# Patient Record
Sex: MALE | Race: WHITE
[De-identification: names, ages, dates, MRNs, and addresses within clinical notes are randomized per-mention and may not be internally consistent; named-entity substitution may affect disease eponyms.]

---

## 2018-04-16 ENCOUNTER — HOSPITAL ENCOUNTER (OUTPATIENT)
Dept: HOSPITAL 47 - ORWHC2ENDO | Age: 58
Discharge: HOME | End: 2018-04-16
Payer: COMMERCIAL

## 2018-04-16 VITALS — HEART RATE: 67 BPM | SYSTOLIC BLOOD PRESSURE: 147 MMHG | DIASTOLIC BLOOD PRESSURE: 87 MMHG

## 2018-04-16 VITALS — TEMPERATURE: 98.1 F | RESPIRATION RATE: 16 BRPM

## 2018-04-16 VITALS — BODY MASS INDEX: 30.7 KG/M2

## 2018-04-16 DIAGNOSIS — I10: ICD-10-CM

## 2018-04-16 DIAGNOSIS — F17.210: ICD-10-CM

## 2018-04-16 DIAGNOSIS — Z79.899: ICD-10-CM

## 2018-04-16 DIAGNOSIS — I25.10: ICD-10-CM

## 2018-04-16 DIAGNOSIS — Z86.010: ICD-10-CM

## 2018-04-16 DIAGNOSIS — Z79.82: ICD-10-CM

## 2018-04-16 DIAGNOSIS — Z12.11: Primary | ICD-10-CM

## 2018-04-16 DIAGNOSIS — Z87.19: ICD-10-CM

## 2018-04-16 DIAGNOSIS — Z80.0: ICD-10-CM

## 2018-04-16 DIAGNOSIS — E78.5: ICD-10-CM

## 2018-04-16 DIAGNOSIS — K57.30: ICD-10-CM

## 2018-04-16 PROCEDURE — 45378 DIAGNOSTIC COLONOSCOPY: CPT

## 2018-04-16 NOTE — P.PCN
Date of Procedure: 04/16/18


Procedure(s) Performed: 


Procedure: Total colonoscopy.





Preoperative diagnosis: Screening for neoplasia, patient has history of polyps.





Preoperative diagnosis: Diverticulosis with no evidence of acute diverticulitis

, strictures, polyps or cancer.





Preparation: HalfLytely prep.





Sedation: Was provided by anesthesia.





Brief clinical history: The patient is a 57-year-old male who is scheduled for 

this evaluation for screening for neoplasia because of history of polyps.  His 

last exam was in January 2015 and he had a tubulovillous adenoma removed in the 

sigmoid and to diminutive rectal polyps were removed that showed hyperplastic 

polyps.  The patient has family history of colon cancer as well.  The patient 

has no abdominal complaints, bleeding or anemia.  





Procedure: With the patient on his left lateral decubitus position and after 

informed consent and adequate sedation, the perianal area was inspected and it 

did not show any fissures or fistulas.  There were no masses felt on digital 

rectal examination.  The Olympus CFQ 160L video colonoscope was then inserted 

in the rectum in the usual fashion and advanced to the cecum.  The mucosa 

appeared healthy.  No polyps or tumors were seen.  Several diverticular 

orifices were seen scattered in the sigmoid and on the right side with no 

evidence of acute diverticulitis or strictures.  I retroflexed the endoscope in 

the rectum before the endoscope was withdrawn.





The patient tolerated the procedure well.





Plan: The patient was reassured.  Discussed dietary measures.  He will follow 

up with you as planned and I recommended repeat exam in 5 years.

## 2018-07-26 ENCOUNTER — HOSPITAL ENCOUNTER (INPATIENT)
Dept: HOSPITAL 47 - EC | Age: 58
LOS: 2 days | Discharge: HOME | DRG: 247 | End: 2018-07-28
Attending: HOSPITALIST | Admitting: HOSPITALIST
Payer: COMMERCIAL

## 2018-07-26 VITALS — BODY MASS INDEX: 29.2 KG/M2

## 2018-07-26 DIAGNOSIS — Z79.899: ICD-10-CM

## 2018-07-26 DIAGNOSIS — Z86.010: ICD-10-CM

## 2018-07-26 DIAGNOSIS — Z79.02: ICD-10-CM

## 2018-07-26 DIAGNOSIS — I25.110: Primary | ICD-10-CM

## 2018-07-26 DIAGNOSIS — Z87.442: ICD-10-CM

## 2018-07-26 DIAGNOSIS — J42: ICD-10-CM

## 2018-07-26 DIAGNOSIS — F17.210: ICD-10-CM

## 2018-07-26 DIAGNOSIS — Z82.49: ICD-10-CM

## 2018-07-26 DIAGNOSIS — E78.5: ICD-10-CM

## 2018-07-26 DIAGNOSIS — Z79.82: ICD-10-CM

## 2018-07-26 DIAGNOSIS — I10: ICD-10-CM

## 2018-07-26 LAB
ALBUMIN SERPL-MCNC: 4.1 G/DL (ref 3.5–5)
ALP SERPL-CCNC: 44 U/L (ref 38–126)
ALT SERPL-CCNC: 32 U/L (ref 21–72)
ANION GAP SERPL CALC-SCNC: 10 MMOL/L
APTT BLD: 27.7 SEC (ref 22–30)
AST SERPL-CCNC: 25 U/L (ref 17–59)
BASOPHILS # BLD AUTO: 0 K/UL (ref 0–0.2)
BASOPHILS NFR BLD AUTO: 1 %
BUN SERPL-SCNC: 13 MG/DL (ref 9–20)
CALCIUM SPEC-MCNC: 8.5 MG/DL (ref 8.4–10.2)
CHLORIDE SERPL-SCNC: 108 MMOL/L (ref 98–107)
CK SERPL-CCNC: 116 U/L (ref 55–170)
CO2 SERPL-SCNC: 23 MMOL/L (ref 22–30)
D DIMER PPP FEU-MCNC: <0.17 MG/L FEU (ref ?–0.6)
EOSINOPHIL # BLD AUTO: 0.3 K/UL (ref 0–0.7)
EOSINOPHIL NFR BLD AUTO: 5 %
ERYTHROCYTE [DISTWIDTH] IN BLOOD BY AUTOMATED COUNT: 4.23 M/UL (ref 4.3–5.9)
ERYTHROCYTE [DISTWIDTH] IN BLOOD: 12.7 % (ref 11.5–15.5)
GLUCOSE SERPL-MCNC: 93 MG/DL (ref 74–99)
HCT VFR BLD AUTO: 39.2 % (ref 39–53)
HGB BLD-MCNC: 13.2 GM/DL (ref 13–17.5)
INR PPP: 1 (ref ?–1.2)
LYMPHOCYTES # SPEC AUTO: 2.3 K/UL (ref 1–4.8)
LYMPHOCYTES NFR SPEC AUTO: 40 %
MAGNESIUM SPEC-SCNC: 2 MG/DL (ref 1.6–2.3)
MCH RBC QN AUTO: 31.3 PG (ref 25–35)
MCHC RBC AUTO-ENTMCNC: 33.7 G/DL (ref 31–37)
MCV RBC AUTO: 92.7 FL (ref 80–100)
MONOCYTES # BLD AUTO: 0.3 K/UL (ref 0–1)
MONOCYTES NFR BLD AUTO: 5 %
NEUTROPHILS # BLD AUTO: 2.8 K/UL (ref 1.3–7.7)
NEUTROPHILS NFR BLD AUTO: 48 %
PLATELET # BLD AUTO: 210 K/UL (ref 150–450)
POTASSIUM SERPL-SCNC: 4 MMOL/L (ref 3.5–5.1)
PROT SERPL-MCNC: 6.3 G/DL (ref 6.3–8.2)
PT BLD: 9.7 SEC (ref 9–12)
SODIUM SERPL-SCNC: 141 MMOL/L (ref 137–145)
TROPONIN I SERPL-MCNC: <0.012 NG/ML (ref 0–0.03)
WBC # BLD AUTO: 5.8 K/UL (ref 3.8–10.6)

## 2018-07-26 PROCEDURE — 93005 ELECTROCARDIOGRAM TRACING: CPT

## 2018-07-26 PROCEDURE — 84484 ASSAY OF TROPONIN QUANT: CPT

## 2018-07-26 PROCEDURE — 83735 ASSAY OF MAGNESIUM: CPT

## 2018-07-26 PROCEDURE — 80048 BASIC METABOLIC PNL TOTAL CA: CPT

## 2018-07-26 PROCEDURE — 85730 THROMBOPLASTIN TIME PARTIAL: CPT

## 2018-07-26 PROCEDURE — 96376 TX/PRO/DX INJ SAME DRUG ADON: CPT

## 2018-07-26 PROCEDURE — 71046 X-RAY EXAM CHEST 2 VIEWS: CPT

## 2018-07-26 PROCEDURE — 82550 ASSAY OF CK (CPK): CPT

## 2018-07-26 PROCEDURE — 85379 FIBRIN DEGRADATION QUANT: CPT

## 2018-07-26 PROCEDURE — 85610 PROTHROMBIN TIME: CPT

## 2018-07-26 PROCEDURE — 36415 COLL VENOUS BLD VENIPUNCTURE: CPT

## 2018-07-26 PROCEDURE — 93458 L HRT ARTERY/VENTRICLE ANGIO: CPT

## 2018-07-26 PROCEDURE — 99291 CRITICAL CARE FIRST HOUR: CPT

## 2018-07-26 PROCEDURE — 82553 CREATINE MB FRACTION: CPT

## 2018-07-26 PROCEDURE — 80053 COMPREHEN METABOLIC PANEL: CPT

## 2018-07-26 PROCEDURE — 80320 DRUG SCREEN QUANTALCOHOLS: CPT

## 2018-07-26 PROCEDURE — 99406 BEHAV CHNG SMOKING 3-10 MIN: CPT

## 2018-07-26 PROCEDURE — 96365 THER/PROPH/DIAG IV INF INIT: CPT

## 2018-07-26 PROCEDURE — 85025 COMPLETE CBC W/AUTO DIFF WBC: CPT

## 2018-07-26 PROCEDURE — 80061 LIPID PANEL: CPT

## 2018-07-26 PROCEDURE — 83880 ASSAY OF NATRIURETIC PEPTIDE: CPT

## 2018-07-26 NOTE — ED
Chest Pain HPI





- General


Chief Complaint: Chest Pain


Stated Complaint: Chest Pain


Time Seen by Provider: 07/26/18 22:36


Source: patient, EMS, RN notes reviewed


Mode of arrival: EMS


Limitations: no limitations





- History of Present Illness


Initial Comments: 





This is a 58-year-old male who was brought in by EMS with complaints of acid 

chest pain about one hour ago.  States is midsternal radiating to the right 

side of his neck.  Nature he denies any fevers chills nausea vomiting sweats 

cough he does patient does admit that he was drinking states for 5 beers per 

day.  He developed a tremor to right upper extremity and route to.  Per report 

he does have a history of a 40% blockage of one of his cardiac arteries she 

does not know which one.  He denies any cough or phlegm production or other 

symptoms.


MD Complaint: chest pain





- Related Data


 Home Medications











 Medication  Instructions  Recorded  Confirmed


 


Aspirin [Adult Low Dose Aspirin EC] 81 mg PO DAILY 04/12/18 07/26/18


 


Lisinopril 30 mg PO DAILY 04/12/18 07/26/18


 


Lovastatin [Mevacor] 40 mg PO DAILY 04/12/18 07/26/18











 Allergies











Allergy/AdvReac Type Severity Reaction Status Date / Time


 


No Known Allergies Allergy   Verified 07/26/18 23:23














Review of Systems


ROS Statement: 


Those systems with pertinent positive or pertinent negative responses have been 

documented in the HPI.





ROS Other: All systems not noted in ROS Statement are negative.





EKG Findings





- EKG Results:


EKG: interpreted by LAVON, sinus rhythm (Sinus rhythm rate 74 artifact is 

present poor R-wave progression  QT since /441)





Past Medical History


Past Medical History: Coronary Artery Disease (CAD), Hyperlipidemia, 

Hypertension


Additional Past Medical History / Comment(s): HX OF COLON POLYPS


History of Any Multi-Drug Resistant Organisms: None Reported


Past Surgical History: Heart Catheterization


Additional Past Surgical History / Comment(s): HEART CATH (10-12 YRS AGO), 

KIDNEY STONES, COLONOSCOPY


Past Anesthesia/Blood Transfusion Reactions: No Reported Reaction


Past Psychological History: No Psychological Hx Reported


Smoking Status: Current every day smoker


Past Alcohol Use History: Daily


Past Drug Use History: None Reported





- Past Family History


  ** Mother


Family Medical History: No Reported History





General Exam





- General Exam Comments


Initial Comments: 





This a well-developed well-nourished awake alert oriented times female he is 

anxious he does have the smell of alcohol conjoiners on his breath


Limitations: no limitations


General appearance: alert, anxious


Head exam: Present: atraumatic, normocephalic, normal inspection


Eye exam: Present: normal appearance, PERRL, EOMI.  Absent: scleral icterus, 

conjunctival injection, periorbital swelling


ENT exam: Present: normal exam, mucous membranes moist


Neck exam: Present: normal inspection.  Absent: tenderness, meningismus, 

lymphadenopathy


Respiratory exam: Present: normal lung sounds bilaterally, chest wall 

tenderness (Different than the stated pain).  Absent: respiratory distress, 

wheezes, rales, rhonchi, stridor


Cardiovascular Exam: Present: regular rate, normal rhythm, normal heart sounds.

  Absent: systolic murmur, diastolic murmur, rubs, gallop, clicks


GI/Abdominal exam: Present: soft, normal bowel sounds.  Absent: distended, 

tenderness, guarding, rebound, rigid


Extremities exam: Present: full ROM, normal capillary refill, other (Intention 

tremor noted to the right upper extremity which does stop when the patient is 

diverted with respect to his attention.).  Absent: tenderness, pedal edema, 

joint swelling, calf tenderness


Back exam: Present: normal inspection


Neurological exam: Present: alert, oriented X3, CN II-XII intact


Psychiatric exam: Present: normal affect, normal mood


Skin exam: Present: warm, dry, intact, normal color.  Absent: rash





Course


 Vital Signs











  07/26/18 07/26/18





  22:36 23:47


 


Temperature 98.6 F 


 


Pulse Rate 72 78


 


Respiratory 18 20





Rate  


 


Blood Pressure 150/86 133/79


 


O2 Sat by Pulse 98 98





Oximetry  














- Reevaluation(s)


Reevaluation #1: 





07/27/18 00:36


Patient has been having recurrent chest pain though his initial workup is 

negative for acute findings.  He did have tremors in his right upper extremity 

which thus far is unexplained though it did seem to be absent when the patient 

was moving or somewhat distracted.  He is right-hand dominant.


Reevaluation #2: 





07/27/18 00:37


We did discuss the risks and benefits of smoking cessation.  The conversation 

lasting 3.2 minutes.





Chest Pain MDM





- MDM





I did review the imaging and reports no acute findings.  Patient does have a 

history of some coronary artery disease no history of heart attack however.  I 

did discuss findings with the patient's family members patient be admitted for 

evaluation by cardiology.  He will be placed on appropriate medication.





Critical Care Time


Critical Care Time: Yes


Critical Care Time: 





31 minutes of critical care time which includes monitoring EMS run and 

discussed with paramedics history physical labs x-rays several reevaluation the 

patient discussed with patient family members regarding findings admission 

orders documentation thereof also discussion with the main service.





Disposition


Clinical Impression: 


 Chest pain, Unstable angina pectoris, Smoking





Disposition: ADMITTED AS IP TO THIS [facility]


Condition: Stable


Referrals: 


Pa Parks DO [Primary Care Provider] - 1-2 days

## 2018-07-26 NOTE — XR
EXAMINATION TYPE: XR chest 2V

 

DATE OF EXAM: 7/26/2018

 

COMPARISON: NONE

 

HISTORY: Chest pain

 

TECHNIQUE:  Frontal and lateral views of the chest are obtained.

 

FINDINGS:  Heart and mediastinum are normal. Lungs are clear. Diaphragm is normal. Bony thorax is nor
mal. There are chest leads.

 

IMPRESSION:  Normal chest

## 2018-07-27 LAB
CK SERPL-CCNC: 74 U/L (ref 55–170)
CK SERPL-CCNC: 83 U/L (ref 55–170)
TROPONIN I SERPL-MCNC: <0.012 NG/ML (ref 0–0.03)
TROPONIN I SERPL-MCNC: <0.012 NG/ML (ref 0–0.03)

## 2018-07-27 PROCEDURE — B2111ZZ FLUOROSCOPY OF MULTIPLE CORONARY ARTERIES USING LOW OSMOLAR CONTRAST: ICD-10-PCS

## 2018-07-27 PROCEDURE — 027034Z DILATION OF CORONARY ARTERY, ONE ARTERY WITH DRUG-ELUTING INTRALUMINAL DEVICE, PERCUTANEOUS APPROACH: ICD-10-PCS

## 2018-07-27 PROCEDURE — 4A023N7 MEASUREMENT OF CARDIAC SAMPLING AND PRESSURE, LEFT HEART, PERCUTANEOUS APPROACH: ICD-10-PCS

## 2018-07-27 RX ADMIN — METOPROLOL TARTRATE SCH MG: 25 TABLET, FILM COATED ORAL at 21:00

## 2018-07-27 RX ADMIN — MIDAZOLAM ONE MG: 1 INJECTION INTRAMUSCULAR; INTRAVENOUS at 10:20

## 2018-07-27 RX ADMIN — KETOROLAC TROMETHAMINE SCH MG: 30 INJECTION, SOLUTION INTRAMUSCULAR at 18:05

## 2018-07-27 RX ADMIN — LISINOPRIL SCH MG: 10 TABLET ORAL at 09:27

## 2018-07-27 RX ADMIN — HEPARIN SODIUM SCH MLS/HR: 5000 INJECTION, SOLUTION INTRAVENOUS at 01:04

## 2018-07-27 RX ADMIN — NITROGLYCERIN ONE MCG: 10 INJECTION INTRAVENOUS at 11:00

## 2018-07-27 RX ADMIN — KETOROLAC TROMETHAMINE SCH: 30 INJECTION, SOLUTION INTRAMUSCULAR at 23:57

## 2018-07-27 RX ADMIN — MIDAZOLAM ONE MG: 1 INJECTION INTRAMUSCULAR; INTRAVENOUS at 10:12

## 2018-07-27 RX ADMIN — NITROGLYCERIN ONE MCG: 10 INJECTION INTRAVENOUS at 11:06

## 2018-07-27 RX ADMIN — CEFAZOLIN SCH MLS/HR: 330 INJECTION, POWDER, FOR SOLUTION INTRAMUSCULAR; INTRAVENOUS at 17:59

## 2018-07-27 NOTE — PTCA
PERCUTANEOUSTRANS CORORONARY ANGIOGRAPHY



DATE OF SERVICE:

07/27/2018.



PROCEDURE:

PTCA and stenting of mid RCA which is a codominant vessel.



PERFORMED BY:

Dr. TEX Mcclure.



Moderate conscious sedation time was 23 minutes.



The patient's EKG, oxygen saturation and hemodynamics were monitored closely.  He

received Versed and Benadryl combination.



CLINICAL INFORMATION:

Mr. Ulysses Burch is a 58-year-old gentleman, a patient of Dr. Zamora, admitted because

of unstable angina symptoms and was evaluated by Dr. Zamora who also performed the

cardiac cath that revealed a hazy 70% mid RCA lesion.  He had noncritical disease in

the left system.  He was advised intervention that was performed in the same setting.



The risks, benefits, options were explained to the patient.



PROCEDURE NOTE:

The existing 6-Tunisian introducer in the right femoral artery was used to perform

procedure.  A standard right Katy guide catheter was used to cannulate the right

coronary artery.  A BMW wire was used to cross the lesion.  Without predilatation, a 12

mm long 2.5 caliber Xience stent was deployed at 11 atmospheres.  Patient had mild

chest discomfort, but no EKG changes.  Excellent angiographic result was achieved.  He

received 600 mg of Plavix.  He received Angiomax bolus and infusion as per protocol.

The sheath was then taken out and Angio-Seal device used to secure hemostasis and he

was sent to the room in a stable condition.



Excellent angiographic result without complication was achieved.  The residual stenosis

was 0%.  Results were discussed with the patient and family.  I expect he will be

discharged tomorrow if he remains stable.





MMODL / IJN: [id number] / Job#: [id number]

## 2018-07-27 NOTE — CONS
CONSULTATION



CHIEF COMPLAINT:

Chest pain.



Ulysses is a 58-year-old gentleman known to me from my outpatient practice with known

mild nonobstructive coronary artery disease, hypertension, dyslipidemia, who presents

to hospital complaining of chest pain.  This started while he was with a group of his

friends, felt like a pressure that was in the precordial area, radiated to his neck.

He has had several intermittent episodes of these, mild to moderate intensity and were

associated with mild diaphoresis.  He was concerned with these symptoms, came to the ER

and the pain subsided after nitroglycerin. At the time of my evaluation, he is chest

pain free and hemodynamically stable. Two sets of troponins are negative.  EKG does not

reveal ischemic changes.  His creatinine is normal at 0.7.  Hemoglobin is normal at

13.2.  Given the symptomatology consistent with unstable angina, I advised the patient

to undergo cardiac catheterization for further evaluation.  He has been explained of

risks, benefits and alternatives.  His coronary risk factors are in the form of

hypertension, dyslipidemia, and smoking.



MEDICATIONS:

Current medications include Mevacor 40 q. daily, lisinopril 30 q. daily and aspirin.



ALLERGIES:

There are no known drug allergies.



FAMILY HISTORY:

Significant for premature coronary artery disease in his grandfather.



SOCIAL HISTORY:

Significant for smoking.  There is no history of EtOH abuse or drug abuse.



REVIEW OF SYSTEMS:

HEENT is unremarkable.

CARDIAC: As described above.

RESPIRATORY: Negative.

GI: Negative.

GENITOURINARY: Negative.

ALLERGY/IMMUNOLOGY:  Negative.

SKIN: Negative.

MUSCULOSKELETAL: Significant for arthritis.

PSYCHOSOCIAL: Negative.

ENDOCRINE: Negative.

HEMATOLOGICAL: Negative.

DERM: Negative.

CONSTITUTIONAL: Negative.

ONCOLOGICAL: Negative.



Rest of the system review is not relevant.



PHYSICAL EXAM:

On exam, patient is afebrile.  Heart rate is 68 beats per minute.  Blood pressure is

123/73, respiratory rate is is 18, O2 sat is 94% on room air.

There is no jugular venous distention.  Carotid upstroke is normal.  There is no bruit.

Chest exam reveals good air entry bilaterally.

Heart exam reveals first and second heart sounds.  No gallop.  No murmur.  No rub.

Abdomen is soft, nontender.

Examination of the extremities did not reveal any edema.  Peripheral pulses are felt.



LABS:

Labs show that the tropes are negative. Creatinine is normal.  Hemoglobin is normal.

Platelet count is normal.  D-dimer is negative.



ASSESSMENT:

Unstable angina.



PLAN:

Patient will undergo cardiac catheterization today.  He has been explained of risks,

benefits and alternatives, understood and accepted.





AFSHAN / JENNIFERN: [id number] / Job#: [id number]

## 2018-07-27 NOTE — LTR
July 27, 2018





Re: Humberto Ulysses



Dear Dr. Parks:



Thank you for the opportunity to participate in the care of . Ulysses Burch. I am

pleased to report to you that his mid RCA was addressed with a drug-eluting stent.

Excellent angiographic result was achieved.  I expect that he will be discharged

tomorrow if he remains stable.



Thank you for your referral and please call for questions.



With kindest regards.



Sincerely yours,



MD AFSHAN Ronquillo / ROBINSON: [id number] / Job#: [id number]

## 2018-07-27 NOTE — CC
CARDIAC CATHETERIZATION REPORT



INDICATION:

Unstable angina.



PROCEDURE NOTE:

After obtaining informed consent, left heart catheterization and coronary angiogram

were performed via the right femoral artery using standard Katy catheters.  Patient

tolerated the procedure well without any obvious immediate complications.  Patient

received moderate conscious sedation.  Total sedation time was 17 minutes.



FINDINGS:

1. HEMODYNAMICS:  Left ventricular end-diastolic pressure is 14 mm.  There is no

    significant gradient across the aortic valve.

2. LEFT VENTRICULOGRAM.  Left ventriculogram is not performed.

3. ANGIOGRAPHIC DATA:

4. LEFT MAIN CORONARY ARTERY:  Left main coronary artery is a normal-sized vessel and

    is free of stenosis.  It divides into left anterior descending coronary artery and

    circumflex coronary artery. LAD shows mild nonobstructive disease in the LAD.  The

    circumflex coronary artery and its branches are free of stenosis. Right coronary

    artery is a dominant vessel that in its midportion shows a 60% stenosis.  There is

    an area of haziness and what appears like a plaque rupture. A 60% mid RCA stenosis.



PLAN:

I am going to have Dr. TEX Mcclure the on-call interventionist review the angiographic

data and advise an angioplasty of the same.





MMODL / IJN: [id number] / Job#: [id number]

## 2018-07-28 VITALS — DIASTOLIC BLOOD PRESSURE: 85 MMHG | HEART RATE: 56 BPM | TEMPERATURE: 97.3 F | SYSTOLIC BLOOD PRESSURE: 152 MMHG

## 2018-07-28 VITALS — RESPIRATION RATE: 18 BRPM

## 2018-07-28 LAB
ANION GAP SERPL CALC-SCNC: 4 MMOL/L
BASOPHILS # BLD AUTO: 0 K/UL (ref 0–0.2)
BASOPHILS NFR BLD AUTO: 0 %
BUN SERPL-SCNC: 12 MG/DL (ref 9–20)
CALCIUM SPEC-MCNC: 8.5 MG/DL (ref 8.4–10.2)
CHLORIDE SERPL-SCNC: 109 MMOL/L (ref 98–107)
CHOLEST SERPL-MCNC: 134 MG/DL (ref ?–200)
CO2 SERPL-SCNC: 28 MMOL/L (ref 22–30)
EOSINOPHIL # BLD AUTO: 0.2 K/UL (ref 0–0.7)
EOSINOPHIL NFR BLD AUTO: 3 %
ERYTHROCYTE [DISTWIDTH] IN BLOOD BY AUTOMATED COUNT: 4.34 M/UL (ref 4.3–5.9)
ERYTHROCYTE [DISTWIDTH] IN BLOOD: 12.6 % (ref 11.5–15.5)
GLUCOSE SERPL-MCNC: 91 MG/DL (ref 74–99)
HCT VFR BLD AUTO: 41.1 % (ref 39–53)
HDLC SERPL-MCNC: 40 MG/DL (ref 40–60)
HGB BLD-MCNC: 13.8 GM/DL (ref 13–17.5)
LDLC SERPL CALC-MCNC: 80 MG/DL (ref 0–99)
LYMPHOCYTES # SPEC AUTO: 1.4 K/UL (ref 1–4.8)
LYMPHOCYTES NFR SPEC AUTO: 25 %
MCH RBC QN AUTO: 31.7 PG (ref 25–35)
MCHC RBC AUTO-ENTMCNC: 33.5 G/DL (ref 31–37)
MCV RBC AUTO: 94.7 FL (ref 80–100)
MONOCYTES # BLD AUTO: 0.4 K/UL (ref 0–1)
MONOCYTES NFR BLD AUTO: 6 %
NEUTROPHILS # BLD AUTO: 3.7 K/UL (ref 1.3–7.7)
NEUTROPHILS NFR BLD AUTO: 64 %
PLATELET # BLD AUTO: 189 K/UL (ref 150–450)
POTASSIUM SERPL-SCNC: 4.5 MMOL/L (ref 3.5–5.1)
SODIUM SERPL-SCNC: 141 MMOL/L (ref 137–145)
TRIGL SERPL-MCNC: 68 MG/DL (ref ?–150)
WBC # BLD AUTO: 5.8 K/UL (ref 3.8–10.6)

## 2018-07-28 RX ADMIN — LISINOPRIL SCH MG: 10 TABLET ORAL at 08:52

## 2018-07-28 RX ADMIN — METOPROLOL TARTRATE SCH MG: 25 TABLET, FILM COATED ORAL at 08:52

## 2018-07-28 RX ADMIN — HEPARIN SODIUM SCH: 5000 INJECTION, SOLUTION INTRAVENOUS at 06:49

## 2018-07-28 RX ADMIN — CEFAZOLIN SCH: 330 INJECTION, POWDER, FOR SOLUTION INTRAMUSCULAR; INTRAVENOUS at 06:50

## 2018-07-28 RX ADMIN — KETOROLAC TROMETHAMINE SCH: 30 INJECTION, SOLUTION INTRAMUSCULAR at 06:50

## 2018-07-28 RX ADMIN — KETOROLAC TROMETHAMINE SCH: 30 INJECTION, SOLUTION INTRAMUSCULAR at 13:12

## 2018-07-28 NOTE — PN
PROGRESS NOTE



Mr. Paul is a 58 -year-old male who presented with symptoms of chest discomfort,

underwent cardiac catheterization by Dr. Zamora and subsequent of his mid right coronary

artery. He is doing well this morning. He is denying any chest pain. He has been

ambulating without any difficulty.  He denies dizziness or palpitations. He denies any

nausea.  He continues to be at this time on aspirin once a day, Lipitor 80 mg daily,

Plavix 75 mg daily, Lisinopril 30 mg daily, Metoprolol tartrate 12.5 mg twice a day.



On physical examination, blood pressure 143/50 with a heart rate in the 60s. Lungs

clear. Heart regular rate and rhythm. S1, S2, no S3, no rub.  Abdomen soft and

nontender. Right groin no hematoma.



IMPRESSION:

1. Status post stenting of the right coronary artery stable.

2. Hypertension.

3. Hyperlipidemia.



RECOMMENDATIONS:

The patient should be able to be discharged home today and follow up as an outpatient

with Dr. Zamora.





AFSHAN / JENNIFERN: [id number] / Job#: [id number]

## 2018-07-28 NOTE — HP
HISTORY AND PHYSICAL



DATE OF SERVICE:

07/28/2018



PRESENTING COMPLAINT:

Chest pressure.



HISTORY OF PRESENTING COMPLAINT:

This is a pleasant 58-year-old patient of Dr. Parks.  Chronic stable medical

conditions include hypertension, hyperlipidemia, chronic nicotine dependence.  Patient

did have coronary intervention around 2000, was found to have a 40% stenosis, was

managed medically.  The patient has continued to smoke.  Patient presented with chest

pressure, more so on the right side, going up through his neck.  Patient is more short

of breath than usual, lasted for at least an hour, getting worse.  It did radiate to

the neck.  There was no dizziness, no lightheadedness, land was not getting better on

its own.  Patient decided to present to the ER. Patient's troponins were negative.

Patient was taken in for a cardiac catheterization and angioplasty and stenting to the

mid RCA was carried out for a 70% lesion.  Postprocedure, patient is feeling better.

Patient was admitted by my colleague, the patient was going to the cath lab. Patient

denies any Charley horses.  Patient does cough with clear sputum, on most days, going

on for at least some time for more than 2 years.



REVIEW OF SYSTEMS:

CONSTITUTIONAL: None.

HEENT: None.

RESPIRATORY:  As above.

CARDIOVASCULAR:  As above.

GASTROINTESTINAL:  None.

GENITOURINARY:  None.

MUSCULOSKELETAL: None.

DERMATOLOGICAL:  None.

HEMATOLOGICAL:  None.

LYMPHATIC:  None.

PSYCHIATRY:  None.

NEUROLOGICAL:  None.



PAST MEDICAL HISTORY:

Coronary artery disease, nonobstructive with cardiac cath showing 40%, hyperlipidemia,

hypertension, colon polyps, kidney stones.



PAST SURGICAL HISTORY:

Cardiac catheterization.



SOCIAL HISTORY:

Patient has been smoking on and off for about 30 years about a pack a day.  Drinks

close to about 5 beers a day.  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

excavation.  Patient is .



FAMILY HISTORY:

Reviewed, noncontributory to presentation.



HOME MEDICATIONS:

1. Aspirin 81 mg a day.

2. Lisinopril 30 mg a day.

3. Mevacor 40 mg a day.



ALLERGIES:

None.



PHYSICAL EXAMINATION:

Vital signs on presentation, temperature 98.6, pulse 72, respiration 18, blood pressure

150/86, pulse ox 98% on room air.

GENERAL APPEARANCE: Average built, sitting up, comfortable.

EYES:  Pupils equal, conjunctivae normal.

HEENT:  External appearance of nose and ears normal, oral cavity normal.

NECK:  JVD not raised.  Mass not palpable.

RESPIRATORY:  Effort normal.

LUNGS:  Decreased breath sounds.  Minimal expiratory wheezing.  Mild expiratory

crackles.

CARDIOVASCULAR:  First and second sounds are normal.  No edema.

ABDOMEN:  Soft, nontender.  Liver and spleen not palpable.

LYMPHATIC:  No lymph node palpable in neck or axillae.

PSYCHIATRY: Alert and oriented x3. Mood and affect normal.

NEUROLOGICAL:  Pupils equal.  Cranial nerves grossly intact. Power and sensation

grossly intact.



INVESTIGATIONS:

White count 5.8, hemoglobin 13.2, potassium 4, BUN 13, creatinine 0.70, troponin times

3 negative. LDL 80.  Chest x-ray film interpreted by me shows normal lung fields.  EKG

tracing interpreted by me shows unclear baseline.



ASSESSMENT:

1. Unstable angina from significant stenosis to the right coronary artery leading to

    cardiac catheterization with angioplasty and stenting to the right coronary artery.

2. Essential hypertension.

3. Hyperlipidemia.

4. Chronic nicotine dependence, patient is a cigarette smoker.



PLAN:

Patient is status post angioplasty, stenting.  Patient is on aspirin, Plavix. Lipitor,

beta blocker.  Patient has been up and about in the hallway.  Symptoms are getting

better. Intervention as per Cardiology.





MMODL / IJN: [id number] / Job#: [id number]

## 2018-07-28 NOTE — DS
DISCHARGE SUMMARY



DATE OF ADMISSION:

07/27/2018



DATE OF DISCHARGE:

July 28, 2018



FINAL DIAGNOSES:

1. Unstable angina.

2. Coronary artery disease with successful angioplasty and stenting to the RCA.

3. Essential hypertension.

4. Hyperlipidemia.

5. Chronic nicotine dependence in a cigarette smoker.

6. Chronic bronchitis secondary to cigarette smoking.



HOSPITAL COURSE:

This patient with known prior coronary artery disease with a 40% lesion in 2000

following a cardiac cath, presented with cardiac sounding presentation.  Cardiac cath

showed a 70% lesion to the RCA that was successfully angioplasty and stented.  No

further symptoms.



EXAM:

Lungs slightly decreased breath sounds, minimal wheezing.



DISCHARGE MEDICATIONS:

1. Aspirin 81 mg daily.

2. Lisinopril 30 mg daily.

3. Lipitor 80 mg q.h.s.

4. Plavix 75 mg daily.

5. Lopressor 12.5 p.o. b.i.d.

6. Nicotine 20 mg patch.

7. Nicotine gum 4 mg q.4 p.r.n.

8. Nitrostat 0.4 q.5h p.r.n.



DISCONTINUED MEDICATION:

Mevacor.



Follow up with Dr. Parks 1 week and follow up with Dr. SHIELA Zamora in one week.



Smoking cessation counseling was done with the patient at length including risk of

further MI and other cardiac problems.  More than 3 minutes was spent on this aspect of

the case.  Copy to Dr. Parks and copy to Dr. Zamora.





MMLOVE / ROBINSON: [id number] / Job#: [id number]

## 2021-11-18 ENCOUNTER — HOSPITAL ENCOUNTER (OUTPATIENT)
Dept: HOSPITAL 47 - LABPAT | Age: 61
Discharge: HOME | End: 2021-11-18
Attending: INTERNAL MEDICINE
Payer: COMMERCIAL

## 2021-11-18 DIAGNOSIS — R07.9: ICD-10-CM

## 2021-11-18 DIAGNOSIS — Z01.812: Primary | ICD-10-CM

## 2021-11-18 LAB
ANION GAP SERPL CALC-SCNC: 8 MMOL/L
BUN SERPL-SCNC: 19 MG/DL (ref 9–20)
CHLORIDE SERPL-SCNC: 105 MMOL/L (ref 98–107)
CO2 SERPL-SCNC: 26 MMOL/L (ref 22–30)
ERYTHROCYTE [DISTWIDTH] IN BLOOD BY AUTOMATED COUNT: 4.15 M/UL (ref 4.3–5.9)
ERYTHROCYTE [DISTWIDTH] IN BLOOD: 13 % (ref 11.5–15.5)
HCT VFR BLD AUTO: 39.3 % (ref 39–53)
HGB BLD-MCNC: 13.7 GM/DL (ref 13–17.5)
MCH RBC QN AUTO: 33.1 PG (ref 25–35)
MCHC RBC AUTO-ENTMCNC: 34.9 G/DL (ref 31–37)
MCV RBC AUTO: 94.8 FL (ref 80–100)
PLATELET # BLD AUTO: 214 K/UL (ref 150–450)
POTASSIUM SERPL-SCNC: 4.3 MMOL/L (ref 3.5–5.1)
SODIUM SERPL-SCNC: 139 MMOL/L (ref 137–145)
WBC # BLD AUTO: 6.8 K/UL (ref 3.8–10.6)

## 2021-11-18 PROCEDURE — 36415 COLL VENOUS BLD VENIPUNCTURE: CPT

## 2021-11-18 PROCEDURE — 84520 ASSAY OF UREA NITROGEN: CPT

## 2021-11-18 PROCEDURE — 85027 COMPLETE CBC AUTOMATED: CPT

## 2021-11-18 PROCEDURE — 82565 ASSAY OF CREATININE: CPT

## 2021-11-18 PROCEDURE — 80051 ELECTROLYTE PANEL: CPT

## 2021-11-19 ENCOUNTER — HOSPITAL ENCOUNTER (OUTPATIENT)
Dept: HOSPITAL 47 - CATHCVL | Age: 61
End: 2021-11-19
Attending: INTERNAL MEDICINE
Payer: COMMERCIAL

## 2021-11-19 VITALS — TEMPERATURE: 99.1 F | RESPIRATION RATE: 16 BRPM

## 2021-11-19 VITALS — DIASTOLIC BLOOD PRESSURE: 75 MMHG | SYSTOLIC BLOOD PRESSURE: 123 MMHG | HEART RATE: 66 BPM

## 2021-11-19 DIAGNOSIS — I25.10: Primary | ICD-10-CM

## 2021-11-19 DIAGNOSIS — Z20.822: ICD-10-CM

## 2021-11-19 LAB
ANION GAP SERPL CALC-SCNC: 6 MMOL/L
BASOPHILS # BLD AUTO: 0 K/UL (ref 0–0.2)
BASOPHILS NFR BLD AUTO: 0 %
BUN SERPL-SCNC: 19 MG/DL (ref 9–20)
CALCIUM SPEC-MCNC: 8.6 MG/DL (ref 8.4–10.2)
CHLORIDE SERPL-SCNC: 106 MMOL/L (ref 98–107)
CO2 SERPL-SCNC: 25 MMOL/L (ref 22–30)
EOSINOPHIL # BLD AUTO: 0.1 K/UL (ref 0–0.7)
EOSINOPHIL NFR BLD AUTO: 2 %
ERYTHROCYTE [DISTWIDTH] IN BLOOD BY AUTOMATED COUNT: 4.09 M/UL (ref 4.3–5.9)
ERYTHROCYTE [DISTWIDTH] IN BLOOD: 12.9 % (ref 11.5–15.5)
GLUCOSE SERPL-MCNC: 101 MG/DL (ref 74–99)
HCT VFR BLD AUTO: 38.5 % (ref 39–53)
HGB BLD-MCNC: 13.4 GM/DL (ref 13–17.5)
LYMPHOCYTES # SPEC AUTO: 1.3 K/UL (ref 1–4.8)
LYMPHOCYTES NFR SPEC AUTO: 22 %
MCH RBC QN AUTO: 32.6 PG (ref 25–35)
MCHC RBC AUTO-ENTMCNC: 34.6 G/DL (ref 31–37)
MCV RBC AUTO: 94.2 FL (ref 80–100)
MONOCYTES # BLD AUTO: 0.3 K/UL (ref 0–1)
MONOCYTES NFR BLD AUTO: 5 %
NEUTROPHILS # BLD AUTO: 4.1 K/UL (ref 1.3–7.7)
NEUTROPHILS NFR BLD AUTO: 70 %
PLATELET # BLD AUTO: 202 K/UL (ref 150–450)
POTASSIUM SERPL-SCNC: 4.1 MMOL/L (ref 3.5–5.1)
SODIUM SERPL-SCNC: 137 MMOL/L (ref 137–145)
WBC # BLD AUTO: 5.9 K/UL (ref 3.8–10.6)

## 2021-11-19 PROCEDURE — 93458 L HRT ARTERY/VENTRICLE ANGIO: CPT

## 2021-11-19 PROCEDURE — 87635 SARS-COV-2 COVID-19 AMP PRB: CPT

## 2021-11-19 PROCEDURE — 80048 BASIC METABOLIC PNL TOTAL CA: CPT

## 2021-11-19 PROCEDURE — 85025 COMPLETE CBC W/AUTO DIFF WBC: CPT

## 2021-11-19 RX ADMIN — FENTANYL CITRATE ONE MCG: 50 INJECTION, SOLUTION INTRAMUSCULAR; INTRAVENOUS at 07:44

## 2021-11-19 RX ADMIN — VERAPAMIL HYDROCHLORIDE ONE ML: 2.5 INJECTION, SOLUTION INTRAVENOUS at 07:48

## 2021-11-19 RX ADMIN — MIDAZOLAM ONE MG: 1 INJECTION INTRAMUSCULAR; INTRAVENOUS at 08:35

## 2021-11-19 RX ADMIN — FENTANYL CITRATE ONE MCG: 50 INJECTION, SOLUTION INTRAMUSCULAR; INTRAVENOUS at 07:53

## 2021-11-19 RX ADMIN — VERAPAMIL HYDROCHLORIDE ONE ML: 2.5 INJECTION, SOLUTION INTRAVENOUS at 08:59

## 2021-11-19 RX ADMIN — MIDAZOLAM ONE MG: 1 INJECTION INTRAMUSCULAR; INTRAVENOUS at 07:44

## 2021-11-19 RX ADMIN — HEPARIN SODIUM ONE UNIT: 1000 INJECTION, SOLUTION INTRAVENOUS; SUBCUTANEOUS at 08:00

## 2021-11-19 RX ADMIN — HEPARIN SODIUM ONE UNIT: 1000 INJECTION, SOLUTION INTRAVENOUS; SUBCUTANEOUS at 08:58

## 2021-11-19 NOTE — LTR
November 19, 2021



To: Dr. Parks



Re: Ulysses Burch ([birth year])



Dear Hunter,



I performed cardiac catheterization and Ulysses Burch. A detailed catheterization note

is enclosed for your records. In brief, the cardiac catheterization revealed a tight

stenosis involving the proximal LAD, and patient will undergo angioplasty with stent

placement of the same.



Thank you for giving me the privilege of participating in the care of this pleasant

gentleman.



Sincerely,







Demetrius Zamora M.D.





AFSHAN / ROBINSON: [id number] / Job#: [id number]

## 2021-11-19 NOTE — PTCA
PERCUTANEOUSTRANS CORORONARY ANGIOGRAPHY



DATE OF SERVICE:

11/19/2021



PROCEDURE:

PTCA and stenting of proximal left anterior descending coronary artery.



PERFORMED BY:

Dr. TEX Mcclure.



Moderate conscious sedation time was 28 minutes.  Patient was administered Versed.

Oxygen saturation, hemodynamics and EKG were monitored closely.



CLINICAL INFORMATION:

Mr. Paul is a 61-year-old gentleman who has history of CAD and hypertension.  He has

sick sinus syndrome. Heart rate is always in the low 50s and therefore he is not on a

beta blocker.  This gentleman underwent stenting of mid RCA performed by me in July 2018 with excellent result.  At that time, he had a mild plaque in the proximal LAD.

He presented with unstable angina and Dr. Zamora performed a cardiac cath which revealed

an 80-90 percent proximal LAD stenosis.  He was advised intervention that was performed

in the same setting.



PROCEDURE NOTE:

The existing 6-Djiboutian introducer in the right radial artery was used to perform the

procedure.  I used a JL3.5 guide catheter to cannulate the left coronary artery.  A run-

through wire was used to cross the lesion.  A 2.5 caliber 12 mm NC Trek balloon was

used to pre-dilate the lesion.  I then deployed a 15 mm long 3.5 caliber Xience stent

at 14 atmospheres.  Patient had mild chest pain and precordial ST elevation.  Excellent

angiographic result was achieved.  He received a total of 6500 units of heparin and ACT

was 282.  He received 60 mg of prasugrel.  He had already received aspirin this

morning.  Excellent result was achieved.  Results were discussed with the patient and

wife.  I expect he will be discharged later on today at 5:00 pm if he remains stable.

He will see Dr. Zamora next week.  He will be on prasugrel 10 mg daily and aspirin 81 mg

daily in addition to atorvastatin and lisinopril that he was already taking before.



Excellent angiographic result without complication was achieved.  The previously

dilated RCA was widely patent with good flow and there is no significant disease in the

circumflex vessel.





MMODL / IJN: [id number] / Job#: [id number]

## 2022-06-16 ENCOUNTER — HOSPITAL ENCOUNTER (OUTPATIENT)
Dept: HOSPITAL 47 - SLEEP | Age: 62
End: 2022-06-16
Attending: INTERNAL MEDICINE
Payer: COMMERCIAL

## 2022-06-16 DIAGNOSIS — Z98.890: ICD-10-CM

## 2022-06-16 DIAGNOSIS — E78.5: ICD-10-CM

## 2022-06-16 DIAGNOSIS — I25.10: ICD-10-CM

## 2022-06-16 DIAGNOSIS — Z95.5: ICD-10-CM

## 2022-06-16 DIAGNOSIS — Z79.82: ICD-10-CM

## 2022-06-16 DIAGNOSIS — G47.33: Primary | ICD-10-CM

## 2022-06-16 DIAGNOSIS — E66.9: ICD-10-CM

## 2022-06-16 DIAGNOSIS — I10: ICD-10-CM

## 2022-06-16 DIAGNOSIS — Z79.899: ICD-10-CM

## 2022-06-16 PROCEDURE — 99211 OFF/OP EST MAY X REQ PHY/QHP: CPT

## 2022-06-16 NOTE — CONS
CONSULTATION



DATE OF SERVICE:

06/16/2022



61-year-old gentleman has been evaluated in [facility] for possible obstructive sleep

apnea-hypopnea syndrome.



HISTORY OF PRESENT ILLNESS SLEEP-WAKE EVALUATION:



SLEEP SCHEDULE:

Patient's usual sleep schedule from 9:30 p.m. to 5:30 am basically 7 days a week.



FALLING ASLEEP:

No problems with falling asleep.



DURING SLEEP:

Usually patient sleeps on the side position.  According to his family, he snores and he

was told about episodes of stopped breathing during sleep.  He wakes up with episodes

of gasping for air and dry mouth up to 6 times per night with up to 2 episodes of

nocturia.



No history of hypnagogic hallucinations, sleep paralysis or cataplexy.



DURING THE DAY/SLEEP WAKE EVALUATION:

During the day, patient has difficulties paying attention, falling asleep during the

day, has problems with memory, concentration.  [place] Sleepiness Scale in very high

range of 19.  Patient takes a nap usually after lunch.



PAST MEDICAL HISTORY:

Positive for coronary artery disease, status post stent insertions, back problems,

hypertension, hyperlipidemia.



PAST SURGICAL HISTORY:

Stent insertion to coronary arteries and back surgery.



CURRENT MEDICATIONS:

Clopidogrel 7.5 mg once a day, atorvastatin atorvastatin 80 mg once a day, aspirin 81

mg once a day, amlodipine 10 mg once a day, lisinopril 40 mg once a day.



SOCIAL HISTORY:

Negative for smoking, alcohol consumption occasional.



FAMILY HISTORY:

Cancer, hypertension, heart problems.



REVIEW OF SYSTEMS:

Snoring, multiple awakenings from sleep, significant excessive daytime sleepiness.



PHYSICAL EXAMINATION:

GENERAL:  gentleman without distress.

/78, HR 88, RR 16, height 5 feet 6-1/2, inches, weight 200 pounds, body mass

index 31.7, temperature 98.1, oxygen saturation at room air 99%.  Oropharynx extremely

low position of soft palate, Mallampati 4. Neck 16-1/2 inches in circumference.

NECK:  Supple, no JVD.  Thyroid is not palpable.

LUNGS:  Clear to percussion and to auscultation.  Good air exchange.  No wheezing or

rhonchi.

HEART:  S1, S2 regular.  No murmurs, gallops, or rubs.

ABDOMEN:  Soft and nontender.  Bowel sounds are present.  No organomegaly appreciated.

EXTREMITIES: No clubbing or cyanosis.

CNS:  Awake, alert, and oriented X3.  Cranial nerves 2 to 7 intact.  There is no

fasciculation or atrophy. noted.  No focal deficits observed.



IMPRESSION:

1. Snoring witnessed episodes of stopped breathing during sleep extremely low position

    of soft palate, Mallampati 4,  multiple awakenings from sleep, significant

    sleepiness with high [place] Sleepiness Scale, obstructive sleep apnea-hypopnea

    syndrome.

2. Significant excessive daytime sleepiness with [place] Sleepiness Scale of 19

    dictate necessity to include hypersomnia including narcolepsy without cataplexy in

    differential diagnosis.

3. Coronary artery disease, status post stent insertions.

4. Hypertension.

5. Status post back surgery.

6. Hyperlipidemia.

7. Mild obesity, BMI 31.7.



PLAN:

1. Home sleep apnea test for evaluation of patient breathing during sleep.

2. CPAP/BiPAP titration if sleep study confirms obstructive sleep apnea-hypopnea

    syndrome.

3. Preferable position during sleep on the side.

4. No driving if patient feels any sleepiness.

5. I will see patient for follow up visit to explain results of testing and following

    plan.

Thank you very much for referring this patient for consultation.



Sincerely,









Bobo Limon MD, PhD, FAASM

Diplomat of American Board of Medical Specialties

Sleep Medicine Board of American Board of Internal Medicine

Medical Director of [facility]





MMODL / IJN: [id number] / Job#: [id number]

## 2022-10-20 ENCOUNTER — HOSPITAL ENCOUNTER (OUTPATIENT)
Dept: HOSPITAL 47 - SLEEP | Age: 62
End: 2022-10-20
Attending: INTERNAL MEDICINE
Payer: COMMERCIAL

## 2022-10-20 DIAGNOSIS — G47.33: Primary | ICD-10-CM

## 2022-10-20 DIAGNOSIS — Z95.5: ICD-10-CM

## 2022-10-20 DIAGNOSIS — F17.200: ICD-10-CM

## 2022-10-20 DIAGNOSIS — E66.9: ICD-10-CM

## 2022-10-20 DIAGNOSIS — E78.5: ICD-10-CM

## 2022-10-20 DIAGNOSIS — I10: ICD-10-CM

## 2022-10-20 DIAGNOSIS — Z98.890: ICD-10-CM

## 2022-10-20 PROCEDURE — 99212 OFFICE O/P EST SF 10 MIN: CPT

## 2022-10-20 NOTE — P.PN
Subjective


DATE: 10/20/2022





FOLLOW UP VISIT.





Patient returned to sleep center to discuss results of sleep test and following 

plan.  I discussed results of sleep study with patient in details.  Home sleep 

apnea test showed moderate obstructive sleep apnea hypopnea syndrome with apnea-

hypopnea index 22.9 and oxygen distress to 73%.  Oxygen level was below normal 

for 24 minutes.  I discussed with the patient the risk of not treated 

obstructive sleep apnea hypopnea syndrome.


 [place] sleepiness scale is extremely high 19..





MEDICATIONS:1.  Atorvastatin 80 mg once a day


                          2.  Clopidogrel 75 mg once a day


                          3.  Amlodipine 10 mg once a day


                          4.  Lisinopril 40 mg once a day


                          5.  Aspirin 81 mg once a day


                          6.  Vitamin D supplement


                       





During physical exam:


GENERAL: A pleasant patient without any distress. 


VITAL SIGNS: /81, HR 76, RR 16, weight 205.0, temperature 97.5, oxygen 

saturation at room air 96.


HEENT: PERRLA, EOMI.


NECK: Supple. No JVD. 


LUNGS: Clear to percussion and to auscultation. Good air exchange. No wheezing 

or rhonchi. 


HEART: S1, S2 regular. 


ABDOMEN: Soft and nontender. 


EXTREMITIES: No clubbing or cyanosis. 


CNS: Awake, alert, and oriented x3.  No focal deficit. 





Impressions:


1.  Obstructive sleep apnea hypopnea syndrome in moderate range by results of 

home sleep apnea test, which may underestimate severity of sleep apnea.  Patient

has significant excessive daytime sleepiness.  Possibility of additional 

diagnosis of hypersomnia.


2.  Coronary artery disease, status post stent insertions.


3.  Hypertension.


4.  Status post back surgery.


5.  Hyperlipidemia.


6.  Mild obesity.











Plan:


1.  Patient will be started on treatment with AutoPAP and should use equipment 

every night for the whole night.


2. Sleep hygiene with regular time in bed for at least 8 hours.


3.  Watching and losing weight


4.  Precautions related to driving. No driving if feel any sleepiness.  Patient 

is aware about civil and criminal liability for unsafe driving, promised to 

follow recommendations.


5.   Follow up visit in 1-2 months after patient will be initiated on treatment 

with CPAP we will clinical response on treatment, compliance with treatment and 

make any necessary adjustments related to mask fitting pressure and 

humidification.





Thank you very much for allowing me to participate in the management of your 

patient.








Bobo Limon MD, PhD, FAASM.


Diplomat of American Board of Sleep Medicine,


Sleep Medicine Board by American Board of Internal Medicine


Medical Director of [facility]

## 2024-12-12 ENCOUNTER — HOSPITAL ENCOUNTER (OUTPATIENT)
Dept: HOSPITAL 47 - RADUSWWP | Age: 64
Discharge: HOME | End: 2024-12-12
Attending: FAMILY MEDICINE
Payer: COMMERCIAL

## 2024-12-12 DIAGNOSIS — R09.89: Primary | ICD-10-CM

## 2024-12-12 PROCEDURE — 76536 US EXAM OF HEAD AND NECK: CPT

## 2024-12-12 NOTE — US
EXAMINATION TYPE: US thyroid st tissue head/neck

 

DATE OF EXAM: 12/12/2024

 

COMPARISON: NONE

 

CLINICAL INDICATION: Male, 64 years old with history of Feelings of lump in throat; R09.89; tightness
 feels something in throat. 

 

TECHNIQUE: Grayscale and color Doppler imaging of the thyroid gland.

 

FINDINGS:

 

GLAND SIZE:

 

Right Lobe: 3.8 x 2.0 x 2.1 cm

** Overall Parenchyma:  heterogeneous

Left Lobe: 3.8 x 1.8 x 1.8 cm

** Overall Parenchyma:  heterogeneous

Isthmus Thickness:  .2 cm

 

NODULES

 

RIGHT:   # of nodules measured on right: 0

 

 

LEFT:    # of nodules measured on left:  0

 

 

ISTHMUS:    # of nodules measured in the isthmus:  0

 

Bilateral neck scanned, no evidence of lymphadenopathy.

 

 

 

IMPRESSION:

 

The largest fibroid gland, no discrete nodules, correlate with serum markers for thyroiditis.

 

 

 

 

 

 

 

 

 

 

 

 

[facility], Workstation: [id number], 12/12/2024 7:01 PM

## 2025-02-21 ENCOUNTER — HOSPITAL ENCOUNTER (OUTPATIENT)
Dept: HOSPITAL 47 - EC | Age: 65
Setting detail: OBSERVATION
LOS: 1 days | Discharge: HOME | End: 2025-02-22
Attending: HOSPITALIST | Admitting: HOSPITALIST
Payer: COMMERCIAL

## 2025-02-21 VITALS — RESPIRATION RATE: 18 BRPM

## 2025-02-21 DIAGNOSIS — Z95.5: ICD-10-CM

## 2025-02-21 DIAGNOSIS — R00.1: ICD-10-CM

## 2025-02-21 DIAGNOSIS — H91.90: ICD-10-CM

## 2025-02-21 DIAGNOSIS — I11.9: ICD-10-CM

## 2025-02-21 DIAGNOSIS — K21.9: ICD-10-CM

## 2025-02-21 DIAGNOSIS — I08.1: ICD-10-CM

## 2025-02-21 DIAGNOSIS — K57.30: ICD-10-CM

## 2025-02-21 DIAGNOSIS — K76.0: ICD-10-CM

## 2025-02-21 DIAGNOSIS — R10.13: Primary | ICD-10-CM

## 2025-02-21 DIAGNOSIS — R11.0: ICD-10-CM

## 2025-02-21 DIAGNOSIS — E66.9: ICD-10-CM

## 2025-02-21 DIAGNOSIS — I25.10: ICD-10-CM

## 2025-02-21 DIAGNOSIS — R07.89: ICD-10-CM

## 2025-02-21 DIAGNOSIS — J42: ICD-10-CM

## 2025-02-21 DIAGNOSIS — Z79.82: ICD-10-CM

## 2025-02-21 DIAGNOSIS — E78.5: ICD-10-CM

## 2025-02-21 DIAGNOSIS — N20.0: ICD-10-CM

## 2025-02-21 DIAGNOSIS — R61: ICD-10-CM

## 2025-02-21 DIAGNOSIS — M54.9: ICD-10-CM

## 2025-02-21 DIAGNOSIS — F10.90: ICD-10-CM

## 2025-02-21 DIAGNOSIS — Z87.891: ICD-10-CM

## 2025-02-21 DIAGNOSIS — Z79.899: ICD-10-CM

## 2025-02-21 PROCEDURE — 93005 ELECTROCARDIOGRAM TRACING: CPT

## 2025-02-21 PROCEDURE — 83690 ASSAY OF LIPASE: CPT

## 2025-02-21 PROCEDURE — 99285 EMERGENCY DEPT VISIT HI MDM: CPT

## 2025-02-21 PROCEDURE — 96375 TX/PRO/DX INJ NEW DRUG ADDON: CPT

## 2025-02-21 PROCEDURE — 93306 TTE W/DOPPLER COMPLETE: CPT

## 2025-02-21 PROCEDURE — 36415 COLL VENOUS BLD VENIPUNCTURE: CPT

## 2025-02-21 PROCEDURE — 74174 CTA ABD&PLVS W/CONTRAST: CPT

## 2025-02-21 PROCEDURE — 83880 ASSAY OF NATRIURETIC PEPTIDE: CPT

## 2025-02-21 PROCEDURE — 82150 ASSAY OF AMYLASE: CPT

## 2025-02-21 PROCEDURE — 71275 CT ANGIOGRAPHY CHEST: CPT

## 2025-02-21 PROCEDURE — 96374 THER/PROPH/DIAG INJ IV PUSH: CPT

## 2025-02-21 PROCEDURE — 85025 COMPLETE CBC W/AUTO DIFF WBC: CPT

## 2025-02-21 PROCEDURE — 94760 N-INVAS EAR/PLS OXIMETRY 1: CPT

## 2025-02-21 PROCEDURE — 85730 THROMBOPLASTIN TIME PARTIAL: CPT

## 2025-02-21 PROCEDURE — 85610 PROTHROMBIN TIME: CPT

## 2025-02-21 PROCEDURE — 84484 ASSAY OF TROPONIN QUANT: CPT

## 2025-02-21 PROCEDURE — 96361 HYDRATE IV INFUSION ADD-ON: CPT

## 2025-02-21 PROCEDURE — 80053 COMPREHEN METABOLIC PANEL: CPT

## 2025-02-21 PROCEDURE — 83735 ASSAY OF MAGNESIUM: CPT

## 2025-02-21 RX ADMIN — ASPIRIN 81 MG CHEWABLE TABLET STA: 81 TABLET CHEWABLE at 23:20

## 2025-02-21 RX ADMIN — MORPHINE SULFATE STA MG: 4 INJECTION, SOLUTION INTRAMUSCULAR; INTRAVENOUS at 23:29

## 2025-02-21 RX ADMIN — ONDANSETRON STA MG: 2 INJECTION INTRAMUSCULAR; INTRAVENOUS at 23:29

## 2025-02-22 VITALS — TEMPERATURE: 98.1 F | HEART RATE: 65 BPM | DIASTOLIC BLOOD PRESSURE: 74 MMHG | SYSTOLIC BLOOD PRESSURE: 115 MMHG

## 2025-02-22 LAB
ALBUMIN SERPL-MCNC: 4.1 G/DL (ref 3.5–5)
ALP SERPL-CCNC: 48 U/L (ref 38–126)
ALT SERPL-CCNC: 26 U/L (ref 4–49)
AMYLASE SERPL-CCNC: 55 U/L (ref 30–110)
ANION GAP SERPL CALC-SCNC: 6 MMOL/L
APTT BLD: 22.2 SEC (ref 22–30)
AST SERPL-CCNC: 23 U/L (ref 17–59)
BASOPHILS # BLD AUTO: 0 K/UL (ref 0–0.2)
BASOPHILS NFR BLD AUTO: 0 %
BUN SERPL-SCNC: 14 MG/DL (ref 9–20)
CALCIUM SPEC-MCNC: 8.2 MG/DL (ref 8.4–10.2)
CHLORIDE SERPL-SCNC: 106 MMOL/L (ref 98–107)
CO2 SERPL-SCNC: 26 MMOL/L (ref 22–30)
EOSINOPHIL # BLD AUTO: 0.1 K/UL (ref 0–0.7)
EOSINOPHIL NFR BLD AUTO: 2 %
ERYTHROCYTE [DISTWIDTH] IN BLOOD BY AUTOMATED COUNT: 3.97 M/UL (ref 4.3–5.9)
ERYTHROCYTE [DISTWIDTH] IN BLOOD: 14.1 % (ref 11.5–15.5)
GLUCOSE SERPL-MCNC: 109 MG/DL (ref 74–99)
HCT VFR BLD AUTO: 36.8 % (ref 39–53)
HGB BLD-MCNC: 12.7 GM/DL (ref 13–17.5)
INR PPP: 1 (ref ?–1.2)
LIPASE SERPL-CCNC: 238 U/L (ref 23–300)
LYMPHOCYTES # SPEC AUTO: 1.4 K/UL (ref 1–4.8)
LYMPHOCYTES NFR SPEC AUTO: 18 %
MAGNESIUM SPEC-SCNC: 2.2 MG/DL (ref 1.6–2.3)
MCH RBC QN AUTO: 31.9 PG (ref 25–35)
MCHC RBC AUTO-ENTMCNC: 34.4 G/DL (ref 31–37)
MCV RBC AUTO: 92.7 FL (ref 80–100)
MONOCYTES # BLD AUTO: 0.4 K/UL (ref 0–1)
MONOCYTES NFR BLD AUTO: 5 %
NEUTROPHILS # BLD AUTO: 5.7 K/UL (ref 1.3–7.7)
NEUTROPHILS NFR BLD AUTO: 74 %
NT-PROBNP SERPL-MCNC: <20 PG/ML
PLATELET # BLD AUTO: 227 K/UL (ref 150–450)
POTASSIUM SERPL-SCNC: 4 MMOL/L (ref 3.5–5.1)
PROT SERPL-MCNC: 6.2 G/DL (ref 6.3–8.2)
PT BLD: 11 SEC (ref 10–12.5)
SODIUM SERPL-SCNC: 138 MMOL/L (ref 137–145)
WBC # BLD AUTO: 7.6 K/UL (ref 3.8–10.6)

## 2025-02-22 RX ADMIN — NITROGLYCERIN STA INCH: 20 OINTMENT TOPICAL at 01:15

## 2025-02-22 RX ADMIN — LIDOCAINE HYDROCHLORIDE ONE ML: 20 SOLUTION ORAL; TOPICAL at 03:11

## 2025-02-22 RX ADMIN — FAMOTIDINE SCH MG: 20 TABLET, FILM COATED ORAL at 08:19

## 2025-02-22 RX ADMIN — ACETAMINOPHEN STA ML: 160 SOLUTION ORAL at 01:09

## 2025-02-22 RX ADMIN — CEFAZOLIN SCH MLS/HR: 330 INJECTION, POWDER, FOR SOLUTION INTRAMUSCULAR; INTRAVENOUS at 03:11

## 2025-02-22 RX ADMIN — FAMOTIDINE STA MG: 10 INJECTION, SOLUTION INTRAVENOUS at 01:07

## 2025-02-22 RX ADMIN — EZETIMIBE SCH MG: 10 TABLET ORAL at 09:57

## 2025-02-22 RX ADMIN — CALCIUM CARBONATE (ANTACID) CHEW TAB 500 MG PRN MG: 500 CHEW TAB at 13:54

## 2025-02-22 RX ADMIN — NICARDIPINE HYDROCHLORIDE STA MLS/HR: 2.5 INJECTION INTRAVENOUS at 00:20

## 2025-02-22 RX ADMIN — ASPIRIN 81 MG CHEWABLE TABLET SCH MG: 81 TABLET CHEWABLE at 09:56

## 2025-02-22 RX ADMIN — PANTOPRAZOLE SODIUM STA MG: 40 INJECTION, POWDER, FOR SOLUTION INTRAVENOUS at 03:03

## 2025-02-22 NOTE — P.HPIM
History of Present Illness


H&P Date: 02/22/25


Chief Complaint: Epigastric pain





Very pleasant 64-year-old patient, follows Dr. Parks.  Chronic medical 

conditions include CAD with stent to RCA in 2018, hypertension, hyperlipidemia, 

chronic bronchitis, hard of hearing


Patient presented with while driving he felt a sharp pain he actually said lower

chest but really is in the epigastric area going straight to the back.  No 

radiation.  No precordial pain.  No shortness of breath.  Patient drinks about 4

beers about 4 times a week.  Very occasionally he gets heartburn.  Otherwise 

fairly active.





Review of systems:


GEN.: None


EYES: None


HEENT: Decreased hearing e


NECK: None


RESPIRATORY: None


CARDIOVASCULAR: None


GASTROINTESTINAL: As above]


GENITOURINARY: None


MUSCULOSKELETAL: None


LYMPHATICS: None


HEMATOLOGICAL: None


PSYCHIATRY: None


NEUROLOGICAL: None





Social history:


.  Drinks about 4 beers about 4 times a week.  Stop smoking about 7 years

ago smoked for 7 years prior to that.  Over 30 years





Physical examination:


VITAL SIGNS: 98, 62, 18, 124 x 67, 95% room air


GENERAL: BMI 33.1, reclining bed awake comfortable.


EYES: Pupils equal.  Conjunctiva valerio l.


HEENT: External appearance of nose and ears normal, oral cavity grossly normal. 

Decreased hearing


NECK: JVD not raised; masses not palpable.


HEART: First and second heart sounds are normal;  no edema.  


LUNGS: Respiratory rate normal; clear to auscultation.  


ABDOMEN: Soft,  nontender, liver spleen not palpable, no masses palpable.  


PSYCH: Alert and oriented x3;  mood  and affect valerio l.  


MUSCULOSKELETAL:No Clubbing/cyanosis;muscles-grossly intact


NEUROLOGICAL: Cranial nerves grossly intact; no facial asymmetry,   power and se

nsation grossly intact. 


LYMPHATICS: No lymph nodes palpable in the axilla and neck





INVESTIGATIONS, reviewed in the clinical context:





February 22, 2025: White count 7.6 hemoglobin 12.7 platelets 227 sodium 138 

potassium 4 creatinine 0.76


Troponin I x 3 less than 0.012 amylase 55 lipase 238


EKG tracing personally reviewed by me-sinus bradycardia


Thoracic aortic CT: Fatty liver 0.4 to 0.3 cm nonobstructing left renal calculi 

colonic diverticulosis





Assessment plan:





-No chest/epigastric pain.  Going to the back.  Given that patient drinks about 

4 beers about 4 times a week likely dealing with gastritis that could be a 

posterior ulcer.  Occasionally complains of reflux.


Start patient on Pepcid.  Advised taking alcohol.  Have patient follow 

outpatient with GI Dr. Zamora for EGD





-Atypical anterior chest pain.  Given history of CAD consult cardiology.


Troponin negative.  2D echo.





-Colonic diverticulosis, asymptomatic





-Left kidney stones, asymptomatic





-CAD with prior history of stent in 2018


Follows Dr. ANGEL Zamora


Aspirin.  Lipitor.  Amlodipine





-Essential hypertension


Amlodipine 10 mg, lisinopril 40 mg





-Hyperlipidemia


Lipitor 80 mg.  Zetia 10 mg.





-Hard of hearing


Patient does use his late mother's hearing aids.  Advised to get further 

checkup.





Care discussed with the patient wife at the bedside.





Past Medical History


Past Medical History: Coronary Artery Disease (CAD), Hyperlipidemia, 

Hypertension


Additional Past Medical History / Comment(s): HX OF COLON POLYPS


History of Any Multi-Drug Resistant Organisms: None Reported


Past Surgical History: Heart Catheterization


Additional Past Surgical History / Comment(s): HEART CATH (10-12 YRS AGO), 

KIDNEY STONES REMOVAL, COLONOSCOPY


Past Anesthesia/Blood Transfusion Reactions: No Reported Reaction


Past Psychological History: No Psychological Hx Reported


Smoking Status: Never smoker


Past Alcohol Use History: Daily


Additional Past Alcohol Use History / Comment(s): SMOKES 1 PPD, SMOKING ON & OFF

FOR 30 YEARS.  DRINKS DAILY 1-4 BEERS /DAY


Past Drug Use History: None Reported





- Past Family History


  ** Mother


Family Medical History: Cancer





  ** Father


Family Medical History: Cancer





  ** Sister(s)


Family Medical History: No Reported History





  ** Brother(s)


Family Medical History: No Reported History





  ** Son(s)


Family Medical History: No Reported History





  ** Daughter(s)


Family Medical History: No Reported History





Medications and Allergies


                                Home Medications











 Medication  Instructions  Recorded  Confirmed  Type


 


Aspirin [Adult Low Dose Aspirin EC] 81 mg PO DAILY 04/12/18 02/22/25 History


 


Nitroglycerin Sl Tabs [Nitrostat] 0.4 mg SUBLINGUAL Q5M PRN #25 tab 07/28/18 02/22/25 Rx


 


Atorvastatin [Lipitor] 80 mg PO DAILY 11/19/21 02/22/25 History


 


Ezetimibe [Zetia] 10 mg PO DAILY 02/22/25 02/22/25 History


 


Famotidine [Pepcid] 20 mg PO BID #60 tab 02/22/25  Rx


 


amLODIPine [Norvasc] 10 mg PO DAILY 02/22/25 02/22/25 History


 


lisinopriL 40 mg PO DAILY 02/22/25 02/22/25 History








                                    Allergies











Allergy/AdvReac Type Severity Reaction Status Date / Time


 


No Known Allergies Allergy   Verified 02/22/25 11:17














Physical Exam


Vitals: 


                                   Vital Signs











  Temp Pulse Pulse Resp BP BP Pulse Ox


 


 02/22/25 08:21  98.0 F   62  18   124/67  95


 


 02/22/25 04:06  97.6 F   53 L  18   112/62  96


 


 02/22/25 03:40   60   18  122/73   97


 


 02/22/25 02:00   53 L   18  114/73   98


 


 02/22/25 00:18   60   18  116/66   97


 


 02/21/25 22:40  97.9 F  69   18  121/80   98








                                Intake and Output











 02/21/25 02/22/25 02/22/25





 22:59 06:59 14:59


 


Other:   


 


  Weight 92.986 kg 92.986 kg 














Results


CBC & Chem 7: 


                                 02/22/25 01:09





                                 02/22/25 01:09


Labs: 


                  Abnormal Lab Results - Last 24 Hours (Table)











  02/22/25 02/22/25 Range/Units





  01:09 01:09 


 


RBC  3.97 L   (4.30-5.90)  m/uL


 


Hgb  12.7 L   (13.0-17.5)  gm/dL


 


Hct  36.8 L   (39.0-53.0)  %


 


Glucose   109 H  (74-99)  mg/dL


 


Calcium   8.2 L  (8.4-10.2)  mg/dL


 


Total Protein   6.2 L  (6.3-8.2)  g/dL














Thrombosis Risk Factor Assmnt





- Choose All That Apply


Any of the Below Risk Factors Present?: Yes


Each Factor Represents 1 point: Age 41-60 years, Obesity (BMI >25)


Each Risk Factor Represents 2 Points: Age 61-74 years


Other congenital or acquired thrombophilia - If yes, enter type in comment: No


Thrombosis Risk Factor Assessment Total Risk Factor Score: 4


Thrombosis Risk Factor Assessment Level: Moderate Risk

## 2025-02-22 NOTE — P.DS
Providers


Date of admission: 


02/22/25 02:33





Expected date of discharge: 02/22/25


Attending physician: 


Jorge Lemon





Consults: 





                                        





02/22/25 02:33


Consult Physician Routine 


   Consulting Provider: Demetrius Zamora


   Consult Reason/Comments: Chest pain


   Do you want consulting provider notified?: Yes, Notify in am











Primary care physician: 


[facility] Course: 





Chief Complaint: Epigastric pain





Very pleasant 64-year-old patient, follows Dr. Parks.  Chronic medical 

conditions include CAD with stent to RCA in 2018, hypertension, hyperlipidemia, 

chronic bronchitis, hard of hearing


Patient presented with while driving he felt a sharp pain he actually said lower

chest but really is in the epigastric area going straight to the back.  No 

radiation.  No precordial pain.  No shortness of breath.  Patient drinks about 4

beers about 4 times a week.  Very occasionally he gets heartburn.  Otherwise 

fairly active.


Pain is felt to be epigastric.  Will have patient see Dr. MAKAYLA Zamora outpatient for

EGD.  Pepcid added.  Advised against alcohol.  2D echocardiogram unremarkable.  

Patient seen by Dr. Young and cleared by him.











Social history:


.  Drinks about 4 beers about 4 times a week.  Stop smoking about 7 years

ago smoked for 7 years prior to that.  Over 30 years





Physical examination:


VITAL SIGNS: 98, 62, 18, 124 x 67, 95% room air


GENERAL: BMI 33.1, reclining bed awake comfortable.


EYES: Pupils equal.  Conjunctiva valerio l.


HEENT: External appearance of nose and ears normal, oral cavity grossly normal. 

Decreased hearing


NECK: JVD not raised; masses not palpable.


HEART: First and second heart sounds are normal;  no edema.  


LUNGS: Respiratory rate normal; clear to auscultation.  


ABDOMEN: Soft,  nontender, liver spleen not palpable, no masses palpable.  


PSYCH: Alert and oriented x3;  mood  and affect valerio l.  


MUSCULOSKELETAL:No Clubbing/cyanosis;muscles-grossly intact


NEUROLOGICAL: Cranial nerves grossly intact; no facial asymmetry,   power and 

sensation grossly intact. 


LYMPHATICS: No lymph nodes palpable in the axilla and neck





INVESTIGATIONS, reviewed in the clinical context:


2D echo: EF 55 to 60%.


February 22, 2025: White count 7.6 hemoglobin 12.7 platelets 227 sodium 138 

potassium 4 creatinine 0.76


Troponin I x 3 less than 0.012 amylase 55 lipase 238


EKG tracing personally reviewed by me-sinus bradycardia


Thoracic aortic CT: Fatty liver 0.4 to 0.3 cm nonobstructing left renal calculi 

colonic diverticulosis





Assessment plan:





-No chest/epigastric pain.  Going to the back.  Given that patient drinks about 

4 beers about 4 times a week likely dealing with gastritis that could be a 

posterior ulcer.  Occasionally complains of reflux.


Start patient on Pepcid.  Advised taking alcohol.  Have patient follow o

utpatient with GI Dr. Zamora for EGD





-Atypical anterior chest pain.  Given history of CAD 


Patient seen and cleared by Dr. Young


Troponin negative.  2D echo-unremarkable.





-Colonic diverticulosis, asymptomatic





-Left kidney stones, asymptomatic





-CAD with prior history of stent in 2018


Follows Dr. ANGEL Zamora


Aspirin.  Lipitor.  Amlodipine





-Essential hypertension


Amlodipine 10 mg, lisinopril 40 mg





-Hyperlipidemia


Lipitor 80 mg.  Zetia 10 mg.





-Hard of hearing


Patient does use his late mother's hearing aids.  Advised to get further 

checkup.





Disposition:


Home





Past Medical History


Past Medical History: Coronary Artery Disease (CAD), Hyperlipidemia, 

Hypertension


Additional Past Medical History / Comment(s): HX OF COLON POLYPS


History of Any Multi-Drug Resistant Organisms: None Reported


Past Surgical History: Heart Catheterization


Additional Past Surgical History / Comment(s): HEART CATH (10-12 YRS AGO), 

KIDNEY STONES REMOVAL, COLONOSCOPY


Past Anesthesia/Blood Transfusion Reactions: No Reported Reaction


Past Psychological History: No Psychological Hx Reported


Smoking Status: Never smoker


Past Alcohol Use History: Daily


Additional Past Alcohol Use History / Comment(s): SMOKES 1 PPD, SMOKING ON & OFF

FOR 30 YEARS.  DRINKS DAILY 1-4 BEERS /DAY


Past Drug Use History: None Reported








Plan - Discharge Summary


Discharge Rx Participant: No


New Discharge Prescriptions: 


New


   Famotidine [Pepcid] 20 mg PO BID #60 tab





Continue


   Aspirin [Adult Low Dose Aspirin EC] 81 mg PO DAILY


   Nitroglycerin Sl Tabs [Nitrostat] 0.4 mg SUBLINGUAL Q5M PRN #25 tab


     PRN Reason: Chest Pain


   Atorvastatin [Lipitor] 80 mg PO DAILY


   amLODIPine [Norvasc] 10 mg PO DAILY


   lisinopriL 40 mg PO DAILY


   Ezetimibe [Zetia] 10 mg PO DAILY


Discharge Medication List





Aspirin [Adult Low Dose Aspirin EC] 81 mg PO DAILY 04/12/18 [History]


Nitroglycerin Sl Tabs [Nitrostat] 0.4 mg SUBLINGUAL Q5M PRN #25 tab 07/28/18 

[Rx]


Atorvastatin [Lipitor] 80 mg PO DAILY 11/19/21 [History]


Ezetimibe [Zetia] 10 mg PO DAILY 02/22/25 [History]


Famotidine [Pepcid] 20 mg PO BID #60 tab 02/22/25 [Rx]


amLODIPine [Norvasc] 10 mg PO DAILY 02/22/25 [History]


lisinopriL 40 mg PO DAILY 02/22/25 [History]








Follow up Appointment(s)/Referral(s): 


cardiology, [Other] - 3 Weeks


Pa Parks DO [Primary Care Provider] - 1-2 days


Nataly Zamora MD [STAFF PHYSICIAN] - 1 Week


(egd


)


Patient Instructions/Handouts:  Angina (GEN), Chest Pain (GEN)

## 2025-02-22 NOTE — ED
General Adult HPI





- General


Chief complaint: Chest Pain


Stated complaint: Chest Pain


Time Seen by Provider: 02/21/25 23:07


Source: patient, EMS


Mode of arrival: EMS





- History of Present Illness


Initial comments: 


Patient is a 64-year-old gentleman past medical history of CAD hypertension 

presenting today for chest pain.  Patient was driving home from a hockey game 

this evening when he began having sharp upper abdominal pain that radiated 

towards his chest and then his back.  He arrived home and took 2 sublingual 

nitroglycerin without improvement of pain.  He did briefly felt sweaty and 

nauseous, denies episodes of emesis.  Denies fevers, chills, shortness of 

breath/difficulty in breathing, cough, lower extremity swelling, dizziness, 

headache, numbness or weakness, changes in vision, recent travel surgeries or 

hospitalizations.  States he does see Dr. Zamora, cardiology due to prior cardiac

stents.  Is due for stress test.








- Related Data


                                Home Medications











 Medication  Instructions  Recorded  Confirmed


 


Aspirin [Adult Low Dose Aspirin EC] 81 mg PO DAILY 04/12/18 02/22/25


 


Atorvastatin [Lipitor] 80 mg PO DAILY 11/19/21 02/22/25


 


Ezetimibe [Zetia] 10 mg PO DAILY 02/22/25 02/22/25


 


amLODIPine [Norvasc] 10 mg PO DAILY 02/22/25 02/22/25


 


lisinopriL 40 mg PO DAILY 02/22/25 02/22/25








                                  Previous Rx's











 Medication  Instructions  Recorded


 


Nitroglycerin Sl Tabs [Nitrostat] 0.4 mg SUBLINGUAL Q5M PRN #25 tab 07/28/18


 


Famotidine [Pepcid] 20 mg PO BID #60 tab 02/22/25











                                    Allergies











Allergy/AdvReac Type Severity Reaction Status Date / Time


 


No Known Allergies Allergy   Verified 02/22/25 11:17














Review of Systems


ROS Statement: 


Those systems with pertinent positive or pertinent negative responses have been 

documented in the HPI.





ROS Other: All systems not noted in ROS Statement are negative.





Past Medical History


Past Medical History: Coronary Artery Disease (CAD), Hyperlipidemia, 

Hypertension


Additional Past Medical History / Comment(s): HX OF COLON POLYPS


History of Any Multi-Drug Resistant Organisms: None Reported


Past Surgical History: Heart Catheterization


Additional Past Surgical History / Comment(s): HEART CATH (10-12 YRS AGO), 

KIDNEY STONES REMOVAL, COLONOSCOPY


Past Anesthesia/Blood Transfusion Reactions: No Reported Reaction


Past Psychological History: No Psychological Hx Reported


Smoking Status: Never smoker


Past Alcohol Use History: Daily


Past Drug Use History: None Reported





- Past Family History


  ** Mother


Family Medical History: No Reported History





  ** Father


Family Medical History: Cancer





  ** Sister(s)


Family Medical History: No Reported History





  ** Brother(s)


Family Medical History: No Reported History





  ** Son(s)


Family Medical History: No Reported History





  ** Daughter(s)


Family Medical History: No Reported History





General Exam





- General Exam Comments


Initial Comments: 





PE:


CONSTITUTIONAL: No apparent distress, well appearing


SKIN: Warm, dry, no jaundice, hives or petechiae


EYES: Pupils are equally round, extraocular movements intact without nystagmus, 

clear conjunctiva, non-icteric sclera


HENT: Normocephalic, atraumatic, moist mucus membranes, oropharynx clear without

 exudates


NECK: , Full range of motion, normal appearance


PULMONARY: Clear to auscultation without wheezes, rhonchi, or rales, normal 

excursion, no accessory muscle use and no stridor


CARDIOVASCULAR: Regular rate, rhythm, normal S1 and S2. No appreciated murmurs, 

rubs or gallops. Strong radial pulses with intact distal perfusion. No lower 

extremity edema


GASTROINTESTINAL: Soft, active bowel sounds throughout, non-tender, non-

distended, no palpable masses, no rebound or guarding. No hepatosplenomegaly


GENITOURINARY: 


MUSCULOSKELETAL: Extremities  have no gross deformity


NEUROLOGIC:_a/o x 3, GCS 15, normal mentation and speech. Moves all extremities 

x 4 without motor or sensory deficit


PSYCHIATRIC:_normal mood and affect, thought process is clear and linear








Course


                                   Vital Signs











  02/21/25 02/22/25 02/22/25





  22:40 00:18 02:00


 


Temperature 97.9 F  


 


Pulse Rate 69 60 53 L


 


Respiratory 18 18 18





Rate   


 


Blood Pressure 121/80 116/66 114/73


 


O2 Sat by Pulse 98 97 98





Oximetry   














  02/22/25





  03:40


 


Temperature 


 


Pulse Rate 60


 


Respiratory 18





Rate 


 


Blood Pressure 122/73


 


O2 Sat by Pulse 97





Oximetry 














- Reevaluation(s)


Reevaluation #1: 


Patient endorsed worsening pain.  States feels like indigestion, requesting an 

acids.  Patient CT scan of the resulted without evidence of dissection.  RN is 

calling lab to find out because of delay in labs.  Reportedly lab was to call an

 hour ago regarding status of lab work and has not called.  And can you repeat 

EKG to ensure no dynamic changes.


02/22/25 01:05








EKG Findings





- EKG Comments:


EKG Findings:: Sinus rhythm, rate 108 bpm CT interval 118 ms QT/QTc 400/418 ms, 

normal axis, no ST elevations or depressions, no arrhythmia no STEMICompared to 

EKG performed 7/26/2018, no new ST elevations or depressions when compared to 

prior, transfer text.  A repeat EKG was obtained due to persistent chest pain, 

this 1 was performed at 1:07 AM, showed sinus bradycardia with a rate of 57 bpm 

QT/QTc 444/424, CT interval 204 ms normal axis, no ST elevations or depressions,

 no new ST elevations or depressions or significant changes from EKG performed 

on arrival





Medical Decision Making





- Medical Decision Making


Was pt. sent in by a medical professional or institution (, PA, NP, urgent 

care, hospital, or nursing home...) When possible be specific


@ -No


Did you speak to anyone other than the patient for history (EMS, parent, family,

 police, friend...)? What history was obtained from this source 


@ -No


Did you review nursing and triage notes (agree or disagree)? Why? 


@ -I reviewed nursing and triage notes


Were old charts reviewed (outside hosp., previous admission, EMS record, old 

EKG, old radiological studies, urgent care reports/EKG's, nursing home records)?

 Report findings 


@ -Medical records reviewed-Patient had a cardiac cath performed on 11/19/2021, 

that showed distal 80% stenosis of the proximal LAD, with planned angioplasty by

 Dr. Mcclure


Differential Diagnosis (chest pain, altered mental status, abdominal pain women,

 abdominal pain men, vaginal bleeding, weakness, fever, dyspnea, syncope, 

headache, dizziness, GI bleed, back pain, seizure, CVA, palpatations, mental 

health, musculoskeletal)? 


Differential Chest Pain:


Stable Angina, Unstable Angina, STEMI, NSTEMI Aortic Dissection, pericarditis, 

pleurisy, chostochondirits,  Pneumothorax, Musculoskeletal, Esophageal Spasm 

GERD, Cholecystitis, Pancreatitis, Zoster, this is not meant to be an all-

inclusive list. 





EKG interpreted by me (3pts min.).


@ -As above


X-rays interpreted by me (1pt min.).


@ -None done


CT interpreted by me (1pt min.).


Personally reviewed CTA chest, I see no evidence of dissection


U/S interpreted by me (1pt. min.).


@ -None done


What testing was considered but not performed or refused? (CT, X-rays, U/S, la

bs)? Why?


@ -None


What meds were considered but not given or refused? Why?


@ -None


Did you discuss the management of the patient with other professionals 

(professionals i.e. , PA, NP, lab, RT, psych nurse, , , 

teacher, , )? Give summary


@ -No


Was smoking cessation discussed for >3mins.?


@ -No


Was critical care preformed (if so, how long)?


@ -No


Were there social determinants of health that impacted care today? How? 

(Homelessness, low income, unemployed, alcoholism, drug addiction, 

transportation, low edu. Level, literacy, decrease access to med. care, long term, 

rehab)?


@ -No


Was there de-escalation of care discussed even if they declined (Discuss DNR or 

withdrawal of care, Hospice)? 


@ -No


What co-morbidities impacted this encounter? (DM, HTN, Smoking, COPD, CAD, 

Cancer, CVA, ARF, Chemo, Hep., AIDS, mental health diagnosis, sleep apnea, 

morbid obesity)?


@History of CAD


Was patient admitted / discharged? Hospital course, mention meds given and 

route, prescriptions, significant lab abnormalities, going to OR and other 

pertinent info.


@ -Admission-patient is a 64-year-old gentleman past medical history of CAD with

 prior stents presenting today for chest pain.  Initial vital signs within 

acceptable limits, patient is not hypertensive however due to chest pain 

radiating to back will obtain CTA dissection study in addition to cardiac labs a

nd pain control.  Patient agreeable plan of care.  On reassessment after 

morphine administration patient deny improvement of pain.  He is in no acute 

distress.  I offered the patient a differential morphine or Dilaudid however he 

platelet climbed.  He states that his discomfort feels more like indigestion so 

he will be given a GI cocktail while pending completion of workup.





On reassessment after Maalox and Pepcid patient endorses improvement of symptoms

 pain now 3 out of 10.  Reviewed labs, overall reassuring troponin.  Patient 

does have an elevated heart score due to description of pain and risk factors so

 will be admitted for observation.  Patient agreeable w/ plan of care.  Case was

 discussed with Dr. Lemon who kindly accepted patient for admission.


Undiagnosed new problem with uncertain prognosis?


@ -No


Drug Therapy requiring intensive monitoring for toxicity (Heparin, Nitro, Insu

con, Cardizem)?


@ -No


Were any procedures done?


@ -No


Diagnosis/symptom?


Chest pain


Acute, or Chronic, or Acute on Chronic?


@Acute


Uncomplicated (without systemic symptoms) or Complicated (systemic symptoms)?


@ -Default


Side effects of treatment?


@ -No


Exacerbation, Progression, or Severe Exacerbation?


@ -No


Poses a threat to life or bodily function? How? (Chest pain, USA, MI, pneumonia,

 PE, COPD, DKA, ARF, appy, cholecystitis, CVA, Diverticulitis, Homicidal, 

Suicidal, threat to staff... and all critical care pts)


@, Potentially








- Lab Data


Result diagrams: 


                                 02/22/25 01:09





                                 02/22/25 01:09


                                   Lab Results











  02/22/25 02/22/25 02/22/25 Range/Units





  01:09 01:09 01:09 


 


WBC  7.6    (3.8-10.6)  k/uL


 


RBC  3.97 L    (4.30-5.90)  m/uL


 


Hgb  12.7 L    (13.0-17.5)  gm/dL


 


Hct  36.8 L    (39.0-53.0)  %


 


MCV  92.7    (80.0-100.0)  fL


 


MCH  31.9    (25.0-35.0)  pg


 


MCHC  34.4    (31.0-37.0)  g/dL


 


RDW  14.1    (11.5-15.5)  %


 


Plt Count  227    (150-450)  k/uL


 


MPV  7.3    


 


Neutrophils %  74    %


 


Lymphocytes %  18    %


 


Monocytes %  5    %


 


Eosinophils %  2    %


 


Basophils %  0    %


 


Neutrophils #  5.7    (1.3-7.7)  k/uL


 


Lymphocytes #  1.4    (1.0-4.8)  k/uL


 


Monocytes #  0.4    (0-1.0)  k/uL


 


Eosinophils #  0.1    (0-0.7)  k/uL


 


Basophils #  0.0    (0-0.2)  k/uL


 


PT   11.0   (10.0-12.5)  sec


 


INR   1.0   (<1.2)  


 


APTT   22.2   (22.0-30.0)  sec


 


Sodium    138  (137-145)  mmol/L


 


Potassium    4.0  (3.5-5.1)  mmol/L


 


Chloride    106  ()  mmol/L


 


Carbon Dioxide    26  (22-30)  mmol/L


 


Anion Gap    6  mmol/L


 


BUN    14  (9-20)  mg/dL


 


Creatinine    0.76  (0.66-1.25)  mg/dL


 


Est GFR (CKD-EPI)AfAm    >90  (>60 ml/min/1.73 sqM)  


 


Est GFR (CKD-EPI)NonAf    >90  (>60 ml/min/1.73 sqM)  


 


Glucose    109 H  (74-99)  mg/dL


 


Calcium    8.2 L  (8.4-10.2)  mg/dL


 


Magnesium    2.2  (1.6-2.3)  mg/dL


 


Total Bilirubin    0.8  (0.2-1.3)  mg/dL


 


AST    23  (17-59)  U/L


 


ALT    26  (4-49)  U/L


 


Alkaline Phosphatase    48  ()  U/L


 


Troponin I     (0.000-0.034)  ng/mL


 


NT-Pro-B Natriuret Pep    <20  pg/mL


 


Total Protein    6.2 L  (6.3-8.2)  g/dL


 


Albumin    4.1  (3.5-5.0)  g/dL


 


Amylase    55  ()  U/L


 


Lipase    238  ()  U/L














  02/22/25 Range/Units





  01:09 


 


WBC   (3.8-10.6)  k/uL


 


RBC   (4.30-5.90)  m/uL


 


Hgb   (13.0-17.5)  gm/dL


 


Hct   (39.0-53.0)  %


 


MCV   (80.0-100.0)  fL


 


MCH   (25.0-35.0)  pg


 


MCHC   (31.0-37.0)  g/dL


 


RDW   (11.5-15.5)  %


 


Plt Count   (150-450)  k/uL


 


MPV   


 


Neutrophils %   %


 


Lymphocytes %   %


 


Monocytes %   %


 


Eosinophils %   %


 


Basophils %   %


 


Neutrophils #   (1.3-7.7)  k/uL


 


Lymphocytes #   (1.0-4.8)  k/uL


 


Monocytes #   (0-1.0)  k/uL


 


Eosinophils #   (0-0.7)  k/uL


 


Basophils #   (0-0.2)  k/uL


 


PT   (10.0-12.5)  sec


 


INR   (<1.2)  


 


APTT   (22.0-30.0)  sec


 


Sodium   (137-145)  mmol/L


 


Potassium   (3.5-5.1)  mmol/L


 


Chloride   ()  mmol/L


 


Carbon Dioxide   (22-30)  mmol/L


 


Anion Gap   mmol/L


 


BUN   (9-20)  mg/dL


 


Creatinine   (0.66-1.25)  mg/dL


 


Est GFR (CKD-EPI)AfAm   (>60 ml/min/1.73 sqM)  


 


Est GFR (CKD-EPI)NonAf   (>60 ml/min/1.73 sqM)  


 


Glucose   (74-99)  mg/dL


 


Calcium   (8.4-10.2)  mg/dL


 


Magnesium   (1.6-2.3)  mg/dL


 


Total Bilirubin   (0.2-1.3)  mg/dL


 


AST   (17-59)  U/L


 


ALT   (4-49)  U/L


 


Alkaline Phosphatase   ()  U/L


 


Troponin I  <0.012  (0.000-0.034)  ng/mL


 


NT-Pro-B Natriuret Pep   pg/mL


 


Total Protein   (6.3-8.2)  g/dL


 


Albumin   (3.5-5.0)  g/dL


 


Amylase   ()  U/L


 


Lipase   ()  U/L














Disposition


Clinical Impression: 


 Chest pain





Disposition: ADMITTED AS IP TO THIS [facility]


Condition: Stable

## 2025-02-22 NOTE — P.CRDCN
History of Present Illness


History of present illness: 





HISTORY OF PRESENT ILLNESS:  





This is a 64-year-old male with a past medical history significant for coronary 

artery disease with previous stenting, hypertension, hyperlipidemia, and daily 

alcohol use. Patient follows in the office with Dr. Zamora. We have been asked to

see the patient in consultation for chest pain. Patient examined at the bedside.

 Patient states yesterday he was watching a hockey game when he began to have 

chest discomfort.  When talking to the patient this morning his pain appeared to

be near the epigastric region.  He states the pain went into his back.  He mecca

ed having any shortness of breath.  Denied any nausea or vomiting.  Denied any 

diaphoresis.  He states that it felt like he had indigestion and said it felt 

like if he could just get out a good burp his pain would have resolved.  Patient

took 2 nitro with no relief and came to the hospital for further evaluation.  

The patient received 2 additional nitro in the emergency room with no relief of 

his pain.  He states that he did receive a GI cocktail which relieved his pain 

and he has had no recurrence of his chest pain.  He states that this pain felt 

different than when he required stenting in the past.  He states he has not had 

any episodes of pain like this previously.  He states that he normally is active

with no chest pain or shortness of breath.  He is a non-smoker.  He does report 

daily alcohol use of 2-3 drinks per day.  He denies any drug use including 

marijuana.





DIAGNOSTICS:


- EKG reveals sinus mechanism with no signs of acute ischemia.


- Thoracic CT: Ascending and descending thoracic aorta are unremarkable without 

evidence of aneurysmal dilatation or dissection.  Cardiomegaly.  Pulmonary 

arteries are unremarkable.  Abdominal aorta is normal in caliber.


- Laboratory data: WBC 7.6.  Hemoglobin 12.7.  Platelet count 227.  Sodium 138. 

Potassium 4.0.  BUN 14.  Creatinine 0.76.  Troponin negative x 3.  proBNP less 

than 20.


- Current home cardiac medications include amlodipine 10 mg daily, aspirin 81 mg

daily, Lipitor 80 mg at night, Zetia 10 mg daily, lisinopril 40 mg daily


- Most recent echocardiogram obtained in November 2021 revealed ejection 

fraction 55%, mild MR, mild TR


- Patient underwent stress testing in May 2022 which was negative for ischemia


- Cardiac catheterization history: 2021 with stenting of the LAD





REVIEW OF SYSTEMS: 


At the time of my exam:


CONSTITUTIONAL: Denies fever or chills.


HEENT: Denies blurred vision, vision changes, or eye pain. Denies hemoptysis 


CARDIOVASCULAR: Denies chest pain. Denies orthopnea. Denies PND. Denies 

palpitations


RESPIRATORY: Denies shortness of breath. 


GASTROINTESTINAL: Denies abdominal pain. Denies nausea or vomiting. 


HEMATOLOGIC: Denies bleeding disorders.


GENITOURINARY:  Denies any blood in urine.


SKIN: Denies pruitis. Denies rash.





PHYSICAL EXAM: 


VITAL SIGNS: Reviewed.


GENERAL: Well-developed in no acute distress. 


HEENT: Head is normocephalic. Pupils are equal, round. Sclerae anicteric. Mucous

membranes of the mouth are moist. Neck supple. No JVD or thyromegaly


LUNGS: Respirations even and unlabored. Lungs essentially clear to auscultation 

bilaterally.


HEART: Regular rate and rhythm.  S1 and S2 heard.


ABDOMEN: Soft. Nondistended. Nontender.


EXTREMITIES: Normal range of motion.  No clubbing or cyanosis.  Peripheral 

pulses intact.  No lower extremity edema


NEUROLOGIC: Awake and alert. Oriented x 3. 





ASSESSMENT: 


Epigastric pain, troponin negative x 3


Coronary artery disease with previous stenting x 2, most recently to the LAD in 

2021


Hypertension


Hyperlipidemia


Daily alcohol use


Obesity: BMI 33.1





PLAN: 


An acute coronary event has been ruled out


Obtain 2D echo to assess cardiac structure and function


Resume home cardiac medications


Increase activity today.  Patient instructed to ambulate in the hallways.


If 2D echo does reveal any significant abnormalities, patient may be discharged 

home today from a cardiac standpoint


We will plan for outpatient stress testing


Further recommendations pending patient course








Nurse practitioner note has been reviewed by physician. Signing provider agrees 

with the documented findings, assessment, and plan of care documented by NP as a

scribe.








Past Medical History


Past Medical History: Coronary Artery Disease (CAD), Hyperlipidemia, 

Hypertension


Additional Past Medical History / Comment(s): HX OF COLON POLYPS


History of Any Multi-Drug Resistant Organisms: None Reported


Past Surgical History: Heart Catheterization


Additional Past Surgical History / Comment(s): HEART CATH (10-12 YRS AGO), 

KIDNEY STONES REMOVAL, COLONOSCOPY


Past Anesthesia/Blood Transfusion Reactions: No Reported Reaction


Past Psychological History: No Psychological Hx Reported


Smoking Status: Never smoker


Past Alcohol Use History: Daily


Additional Past Alcohol Use History / Comment(s): SMOKES 1 PPD, SMOKING ON & OFF

FOR 30 YEARS.  DRINKS DAILY 1-4 BEERS /DAY


Past Drug Use History: None Reported





- Past Family History


  ** Mother


Family Medical History: Cancer





  ** Father


Family Medical History: Cancer





  ** Sister(s)


Family Medical History: No Reported History





  ** Brother(s)


Family Medical History: No Reported History





  ** Son(s)


Family Medical History: No Reported History





  ** Daughter(s)


Family Medical History: No Reported History





Medications and Allergies


                                Home Medications











 Medication  Instructions  Recorded  Confirmed  Type


 


Aspirin [Adult Low Dose Aspirin EC] 81 mg PO DAILY 04/12/18 02/22/25 History


 


Nitroglycerin Sl Tabs [Nitrostat] 0.4 mg SUBLINGUAL Q5M PRN #25 tab 07/28/18 02/22/25 Rx


 


Atorvastatin [Lipitor] 80 mg PO DAILY 11/19/21 02/22/25 History


 


Ezetimibe [Zetia] 10 mg PO DAILY 02/22/25 02/22/25 History


 


amLODIPine [Norvasc] 10 mg PO DAILY 02/22/25 02/22/25 History


 


lisinopriL 40 mg PO DAILY 02/22/25 02/22/25 History








                                    Allergies











Allergy/AdvReac Type Severity Reaction Status Date / Time


 


No Known Allergies Allergy   Verified 02/22/25 11:17














Physical Exam


Vitals: 


                                   Vital Signs











  Temp Pulse Pulse Resp BP BP Pulse Ox


 


 02/22/25 08:21  98.0 F   62  18   124/67  95


 


 02/22/25 04:06  97.6 F   53 L  18   112/62  96


 


 02/22/25 03:40   60   18  122/73   97


 


 02/22/25 02:00   53 L   18  114/73   98


 


 02/22/25 00:18   60   18  116/66   97


 


 02/21/25 22:40  97.9 F  69   18  121/80   98








                                Intake and Output











 02/21/25 02/22/25 02/22/25





 22:59 06:59 14:59


 


Other:   


 


  Weight 92.986 kg 92.986 kg 














Results





                                 02/22/25 01:09





                                 02/22/25 01:09


                                 Cardiac Enzymes











  02/22/25 02/22/25 02/22/25 Range/Units





  01:09 01:09 04:11 


 


AST  23    (17-59)  U/L


 


Troponin I   <0.012  <0.012  (0.000-0.034)  ng/mL








                                   Coagulation











  02/22/25 Range/Units





  01:09 


 


PT  11.0  (10.0-12.5)  sec


 


APTT  22.2  (22.0-30.0)  sec








                                       CBC











  02/22/25 Range/Units





  01:09 


 


WBC  7.6  (3.8-10.6)  k/uL


 


RBC  3.97 L  (4.30-5.90)  m/uL


 


Hgb  12.7 L  (13.0-17.5)  gm/dL


 


Hct  36.8 L  (39.0-53.0)  %


 


Plt Count  227  (150-450)  k/uL








                          Comprehensive Metabolic Panel











  02/22/25 Range/Units





  01:09 


 


Sodium  138  (137-145)  mmol/L


 


Potassium  4.0  (3.5-5.1)  mmol/L


 


Chloride  106  ()  mmol/L


 


Carbon Dioxide  26  (22-30)  mmol/L


 


BUN  14  (9-20)  mg/dL


 


Creatinine  0.76  (0.66-1.25)  mg/dL


 


Glucose  109 H  (74-99)  mg/dL


 


Calcium  8.2 L  (8.4-10.2)  mg/dL


 


AST  23  (17-59)  U/L


 


ALT  26  (4-49)  U/L


 


Alkaline Phosphatase  48  ()  U/L


 


Total Protein  6.2 L  (6.3-8.2)  g/dL


 


Albumin  4.1  (3.5-5.0)  g/dL








                               Current Medications











Generic Name Dose Route Start Last Admin





  Trade Name Freq  PRN Reason Stop Dose Admin


 


Acetaminophen  650 mg  02/22/25 02:33 





  Acetaminophen Tab 325 Mg Tab  PO  





  Q6HR PRN  





  Mild Pain or Fever > 100.5  


 


Al Hydroxide/Mg Hydroxide  15 ml  02/22/25 02:33 





  Mag Hydrox/Al Hydrox/Simeth 30 Ml Cup  PO  





  Q6HR PRN  





  Indigestion  


 


Amlodipine Besylate  10 mg  02/22/25 09:00 





  Amlodipine 10 Mg Tab  PO  





  DAILY RITU  


 


Aspirin  81 mg  02/22/25 09:00 





  Aspirin 81 Mg  PO  





  DAILY RITU  


 


Atorvastatin Calcium  80 mg  02/22/25 21:00 





  Atorvastatin 80 Mg Tab  PO  





  HS RITU  


 


Calcium Carbonate/Glycine  1,000 mg  02/22/25 01:04 





  Calcium Carbonate 500 Mg Chewable  PO  





  ONCE PRN  





  Heartburn  


 


Calcium Carbonate/Glycine  1,000 mg  02/22/25 02:33 





  Calcium Carbonate 500 Mg Chewable  PO  





  Q4HR PRN  





  Dyspepsia  


 


Ezetimibe  10 mg  02/22/25 09:00 





  Ezetimibe 10 Mg Tab  PO  





  DAILY [facility]  


 


Famotidine  20 mg  02/22/25 09:00  02/22/25 08:19





  Famotidine 20 Mg Tab  PO   20 mg





  BID RITU   Administration


 


Sodium Chloride  1,000 mls @ 75 mls/hr  02/22/25 02:45  02/22/25 03:11





  Saline 0.9%  IV   75 mls/hr





  [id number] RITU   Administration


 


Lisinopril  40 mg  02/22/25 09:00 





  Lisinopril 20 Mg Tab  PO  





  DAILY RITU  


 


Morphine Sulfate  4 mg  02/22/25 02:33 





  Morphine Sulfate 4 Mg/Ml Syringe  IV  





  Q4HR PRN  





  Severe Pain (Scale 7 to 10)  


 


Naloxone HCl  0.2 mg  02/22/25 02:33 





  Naloxone 0.4 Mg/Ml 1 Ml Vial  IV  





  Q2M PRN  





  Opioid Reversal  


 


Ondansetron HCl  4 mg  02/22/25 02:33 





  Ondansetron 4 Mg/2 Ml Vial  IVP  





  Q8HR PRN  





  Nausea And Vomiting  


 


Tramadol HCl  50 mg  02/22/25 02:33 





  Tramadol 50 Mg Tab  PO  





  Q6H PRN  





  Moderate Pain (Scale 4 to 6)  








                                Intake and Output











 02/21/25 02/22/25 02/22/25





 22:59 06:59 14:59


 


Other:   


 


  Weight 92.986 kg 92.986 kg 








                                        





                                 02/22/25 01:09 





                                 02/22/25 01:09

## 2025-02-22 NOTE — CT
EXAM:

  CT Abdomen and Pelvis With Runoff to the Lower Extremities Without and 

With Intravenous Contrast

 

CLINICAL HISTORY:

  ITS.REASON CT Reason: concern for dissection chest pain radiates to 

back

 

TECHNIQUE:

  Axial computed tomographic images of the abdomen, pelvis and lower 

extremities without and with intravenous contrast.  CTDI is 9 mGy and DLP 

is 976.8 mGy-cm.  This CT exam was performed using one or more of the 

following dose reduction techniques: automated exposure control, 

adjustment of the mA and/or kV according to patient size, and/or use of 

iterative reconstruction technique.

 

COMPARISON:

  No previous studies.

 

FINDINGS:

 

 VASCULATURE:

  Aorta:  Thoracic aorta is unremarkable.  Atherosclerotic disease of the 

abdominal aorta extending to the common iliac arteries.  No abdominal 

aortic aneurysm.  No dissection.

  Celiac trunk and mesenteric arteries:  Flow is noted within the celiac, 

SMA, the renal arteries, and MOE.

  Renal arteries:  See above.

 

  Right iliac arteries:  Good flow noted within the common iliac, 

external and internal iliac arteries, extending to the common femoral 

arteries.

  Right femoral/popliteal arteries:  See above.

  Right calf/foot arteries:  No acute findings.  No occlusion or 

significant stenosis.

 

  Left iliac arteries:  See above.

  Left femoral/popliteal arteries:  See above.

  Left calf/foot arteries:  No acute findings.  No occlusion or 

significant stenosis.

 

  Lung bases:  Central pulmonary arteries are unremarkable.  Peripheral 

branches the pulmonary arteries are unremarkable.  Scarring and 

subsegmental atelectasis posteriorly at the lung bases.

  Pleural space:  Unremarkable.  No pneumothorax.  No pleural effusions.

  Heart:  Cardiomegaly.

 

 ABDOMEN:

  Liver:  Fatty liver.

  Gallbladder and bile ducts:  Unremarkable.  No ductal dilation.  No 

gallstones.

  Pancreas:  See below.  

  Spleen:  Spleen is normal in contour.

  Adrenals:  The adrenal glands, the head, body, tail of the pancreas are 

unremarkable.

  Kidneys and ureters:  Nonspecific stranding about the perinephric 

spaces.  A 0.4 cm a 0.3 cm nonobstructing left renal calculi.

  Stomach and bowel:  Moderate quantity of ingested material in the 

stomach.  Diverticulosis without diverticulitis.  No bowel obstruction.

 

 PELVIS:

  Appendix:  The appendix is unremarkable.

  Bladder:  Unremarkable.  No stones.  No mass.

  Reproductive:  Unremarkable as visualized.

 

 ABDOMEN, PELVIS and LOWER EXTREMITIES:

  Intraperitoneal space:  Airway is normal.  No significant fluid 

collection.

  Bones/joints:  Evaluation of the right ribs reveals no focal right rib 

abnormalities.  Unremarkable assessment of the left ribs.  No acute 

fracture.  No dislocation.

  Soft tissues:  Thyroid gland is unremarkable.  Ischiorectal fat is 

clean.  3 cm right inguinal hernia containing only mesenteric fat.

  Lymph nodes:  Unremarkable.  No pelvic or inguinal lymphadenopathy.

  Other findings:  Hypoaeration.  Moderate to severe degenerative disc 

disease of the thoracic spine.  Postsurgical changes of the L5-S1 level.

 

IMPRESSION:     

1.  Ascending and descending thoracic aorta are unremarkable without 

evidence of aneurysmal dilatation or dissection.

2.  Central or peripheral pulmonary arteries are unremarkable.

3.  Cardiomegaly.

4.  Abdominal aorta is normal in caliber.

5.  Atherosclerotic disease.

6.  Good flow within the major branch of the abdominal aorta.

7.  Fatty liver.

8.  No renal calculus or hydronephrosis.

9.  The appendix is unremarkable.

10.  No bowel obstruction.

11.  Diverticulosis without diverticulitis.

## 2025-02-22 NOTE — CA
Transthoracic Echo Report 

 Name: Burch, Ulysses 

 MRN:    [id number] 

 Age:    64     Gender:     M 

 

 :    1960 

 Exam Date:     2025 10:06 

 Exam Location: [facility] 

 Ht (in):     66     Wt (lb):     205 

 Ordering Physician:        Gia Mesa 

 Attending/Referring Phys:         [id number], Moshe 

 Technician         Sun Montalvo, NIKI 

 Procedure CPT: 

 Indications:       LV function, CP, hx of CAD 

 

 Cardiac Hx: 

 Technical Quality:      Fair 

 Contrast 1:                                Total Dose (mL): 

 Contrast 2:                                Total Dose (mL): 

 

 MEASUREMENTS  (Male / Female) Normal Values 

 2D ECHO 

 LV Diastolic Diameter PLAX        5.1 cm                4.2 - 5.9 / 3.9 - 5.3 cm 

 LV Systolic Diameter PLAX         3.5 cm                 

 IVS Diastolic Thickness           1.4 cm                0.6 - 1.0 / 0.6 - 0.9 cm 

 LVPW Diastolic Thickness          1.3 cm                0.6 - 1.0 / 0.6 - 0.9 cm 

 LV Relative Wall Thickness        0.5                    

 RV Internal Dim ED PLAX           2.1 cm                 

 LA Systolic Diameter LX           3.7 cm                3.0 - 4.0 / 2.7 - 3.8 cm 

 LV Diastolic Volume MOD BP        81.7 cm???              67 - 155 / 56 - 104 cm??? 

 LV Systolic Volume MOD BP         29.4 cm???               - 58 / 19 - 49 cm??? 

 LV Ejection Fraction MOD BP       64.0 %                >= 55  % 

 LV Cardiac Index MOD BP           2025.9 cm???/min???m???      

 LV Diastolic Volume MOD 4C        80.8 cm???               

 LV Systolic Volume MOD 4C         30.3 cm???               

 LV Ejection Fraction MOD 4C       62.5 %                 

 LV Cardiac Index MOD 4C           1958.0 cm???/min???m???      

 LV Diastolic Length 4C            8.0 cm                 

 LV Systolic Length 4C             6.5 cm                 

 LV Diastolic Volume MOD 2C        81.3 cm???               

 LV Systolic Volume MOD 2C         27.3 cm???               

 LV Ejection Fraction MOD 2C       66.4 %                 

 LV Cardiac Index MOD 2C           2093.2 cm???/min???m???      

 LV Diastolic Length 2C            7.8 cm                 

 LV Systolic Length 2C             6.2 cm                 

 LA Volume                         61.2 cm???              18 - 58 / 22 - 52 cm??? 

 LA Volume Index                   28.9 cm???/m???           16 - 28 cm???/m??? 

 

 M-MODE 

 Aortic Root Diameter MM           3.4 cm                 

 LA Systolic Diameter MM           3.9 cm                 

 LA Ao Ratio MM                    1.1                    

 AV Cusp Separation MM             2.2 cm                 

 

 DOPPLER 

 AI Peak Velocity                  446.3 cm/s             

 AI Peak Gradient                  79.7 mmHg              

 AI Pressure Half Time             637.0 ms               

 MV Area PHT                       2.5 cm???                

 Mitral E Point Velocity           118.4 cm/s             

 Mitral A Point Velocity           104.3 cm/s             

 Mitral E to A Ratio               1.1                    

 MV Deceleration Time              305.8 ms               

 TR Peak Velocity                  166.2 cm/s             

 TR Peak Gradient                  11.0 mmHg              

 

 

 FINDINGS 

 Left Ventricle 

 Left ventricular ejection fraction is estimated at 55-60 %. Normal left  

 ventricular systolic function with no obvious regional wall motion  

 abnormalities. Left ventricular cavity size normal. Mildly increased left  

 ventricular wall thickness. 

 

 Right Ventricle 

 Normal right ventricular size and function. . Right ventricular systolic  

 pressure within normal limits. 

 

 Right Atrium 

 Mild right atrial dilatation. 

 

 Left Atrium 

 Mildly increased left atrial volume. 

 

 Mitral Valve 

 Structurally normal mitral valve. Mild mitral regurgitation. No mitral  

 stenosis. 

 

 Aortic Valve 

 Trileaflet aortic valve. No aortic stenosis. Mild aortic regurgitation.aortic  

 valve sclerosis. 

 

 Tricuspid Valve 

 Structurally normal tricuspid valve.  Mild tricuspid regurgitation. No  

 tricuspid stenosis. 

 

 Pulmonic Valve 

 Trace pulmonic regurgitation. No pulmonic stenosis. Structurally normal  

 pulmonic valve. 

 

 Pericardium 

 No pericardial or pleural effusion. 

 

 Aorta 

 Normal size aortic root and proximal ascending aorta. 

 

 CONCLUSIONS 

 1.  Normal left ventricular size and systolic function 

 2.  Mild mitral, aortic and tricuspid regurgitation 

 Previewed by:  

 Dr. Antonina Young MD 

 (Electronically Signed) 

 Final Date:      2025 14:04

## 2025-07-30 ENCOUNTER — HOSPITAL ENCOUNTER (OUTPATIENT)
Dept: HOSPITAL 47 - 3 N SLEEP | Age: 65
End: 2025-07-30
Payer: COMMERCIAL

## 2025-07-30 VITALS
RESPIRATION RATE: 16 BRPM | DIASTOLIC BLOOD PRESSURE: 76 MMHG | HEART RATE: 63 BPM | TEMPERATURE: 97.9 F | SYSTOLIC BLOOD PRESSURE: 111 MMHG

## 2025-07-30 DIAGNOSIS — I25.10: ICD-10-CM

## 2025-07-30 DIAGNOSIS — F17.200: ICD-10-CM

## 2025-07-30 DIAGNOSIS — Z95.5: ICD-10-CM

## 2025-07-30 DIAGNOSIS — E78.5: ICD-10-CM

## 2025-07-30 DIAGNOSIS — G47.33: Primary | ICD-10-CM

## 2025-07-30 DIAGNOSIS — Z98.890: ICD-10-CM

## 2025-07-30 DIAGNOSIS — I10: ICD-10-CM

## 2025-07-30 PROCEDURE — 99212 OFFICE O/P EST SF 10 MIN: CPT
